# Patient Record
Sex: FEMALE | Race: WHITE | Employment: OTHER | URBAN - NONMETROPOLITAN AREA
[De-identification: names, ages, dates, MRNs, and addresses within clinical notes are randomized per-mention and may not be internally consistent; named-entity substitution may affect disease eponyms.]

---

## 2018-02-22 ENCOUNTER — HOSPITAL ENCOUNTER (OUTPATIENT)
Dept: WOMENS IMAGING | Age: 78
Discharge: HOME | End: 2018-02-22
Payer: MEDICARE

## 2018-02-22 ENCOUNTER — HOSPITAL ENCOUNTER (OUTPATIENT)
Dept: NON INVASIVE DIAGNOSTICS | Age: 78
Discharge: HOME | End: 2018-02-22
Payer: MEDICARE

## 2018-02-22 DIAGNOSIS — R93.7 ABNORMAL BONE DENSITY SCREENING: ICD-10-CM

## 2018-02-22 DIAGNOSIS — Z78.0 POSTMENOPAUSAL: ICD-10-CM

## 2018-02-22 LAB
LV EF: 60 %
LVEF MODALITY: NORMAL

## 2018-02-22 PROCEDURE — 93306 TTE W/DOPPLER COMPLETE: CPT

## 2018-02-22 PROCEDURE — 77080 DXA BONE DENSITY AXIAL: CPT

## 2020-06-05 ENCOUNTER — HOSPITAL ENCOUNTER (OUTPATIENT)
Dept: WOMENS IMAGING | Age: 80
Discharge: HOME | End: 2020-06-05
Payer: MEDICARE

## 2020-06-05 ENCOUNTER — HOSPITAL ENCOUNTER (OUTPATIENT)
Dept: NON INVASIVE DIAGNOSTICS | Age: 80
Discharge: HOME | End: 2020-06-05
Payer: MEDICARE

## 2020-06-05 LAB
LV EF: 60 %
LVEF MODALITY: NORMAL

## 2020-06-05 PROCEDURE — 77080 DXA BONE DENSITY AXIAL: CPT

## 2020-06-05 PROCEDURE — 93306 TTE W/DOPPLER COMPLETE: CPT

## 2020-10-12 ENCOUNTER — HOSPITAL ENCOUNTER (OUTPATIENT)
Dept: GENERAL RADIOLOGY | Age: 80
Discharge: HOME | End: 2020-10-12
Payer: MEDICARE

## 2020-10-12 ENCOUNTER — HOSPITAL ENCOUNTER (OUTPATIENT)
Age: 80
Discharge: HOME | End: 2020-10-12
Payer: MEDICARE

## 2020-10-12 PROCEDURE — 72040 X-RAY EXAM NECK SPINE 2-3 VW: CPT

## 2020-10-15 ENCOUNTER — HOSPITAL ENCOUNTER (OUTPATIENT)
Dept: INTERVENTIONAL RADIOLOGY/VASCULAR | Age: 80
Discharge: HOME | End: 2020-10-15
Payer: MEDICARE

## 2020-10-15 PROCEDURE — 93880 EXTRACRANIAL BILAT STUDY: CPT

## 2020-10-20 ENCOUNTER — PROCEDURE VISIT (OUTPATIENT)
Dept: NEUROLOGY | Age: 80
End: 2020-10-20
Payer: MEDICARE

## 2020-10-20 PROCEDURE — 95911 NRV CNDJ TEST 9-10 STUDIES: CPT | Performed by: PSYCHIATRY & NEUROLOGY

## 2020-10-20 PROCEDURE — 95886 MUSC TEST DONE W/N TEST COMP: CPT | Performed by: PSYCHIATRY & NEUROLOGY

## 2020-10-28 ENCOUNTER — HOSPITAL ENCOUNTER (OUTPATIENT)
Age: 80
Discharge: HOME | End: 2020-10-28
Payer: MEDICARE

## 2020-10-28 ENCOUNTER — OFFICE VISIT (OUTPATIENT)
Dept: CARDIOLOGY CLINIC | Age: 80
End: 2020-10-28
Payer: MEDICARE

## 2020-10-28 ENCOUNTER — HOSPITAL ENCOUNTER (OUTPATIENT)
Dept: GENERAL RADIOLOGY | Age: 80
Discharge: HOME | End: 2020-10-28
Payer: MEDICARE

## 2020-10-28 VITALS — WEIGHT: 132 LBS | HEART RATE: 90 BPM | BODY MASS INDEX: 20 KG/M2 | HEIGHT: 68 IN

## 2020-10-28 PROCEDURE — G8484 FLU IMMUNIZE NO ADMIN: HCPCS | Performed by: INTERNAL MEDICINE

## 2020-10-28 PROCEDURE — G8427 DOCREV CUR MEDS BY ELIG CLIN: HCPCS | Performed by: INTERNAL MEDICINE

## 2020-10-28 PROCEDURE — 72052 X-RAY EXAM NECK SPINE 6/>VWS: CPT

## 2020-10-28 PROCEDURE — 4040F PNEUMOC VAC/ADMIN/RCVD: CPT | Performed by: INTERNAL MEDICINE

## 2020-10-28 PROCEDURE — 99204 OFFICE O/P NEW MOD 45 MIN: CPT | Performed by: INTERNAL MEDICINE

## 2020-10-28 PROCEDURE — 1090F PRES/ABSN URINE INCON ASSESS: CPT | Performed by: INTERNAL MEDICINE

## 2020-10-28 PROCEDURE — 4004F PT TOBACCO SCREEN RCVD TLK: CPT | Performed by: INTERNAL MEDICINE

## 2020-10-28 PROCEDURE — G8399 PT W/DXA RESULTS DOCUMENT: HCPCS | Performed by: INTERNAL MEDICINE

## 2020-10-28 PROCEDURE — G8420 CALC BMI NORM PARAMETERS: HCPCS | Performed by: INTERNAL MEDICINE

## 2020-10-28 PROCEDURE — 93000 ELECTROCARDIOGRAM COMPLETE: CPT | Performed by: INTERNAL MEDICINE

## 2020-10-28 PROCEDURE — 1123F ACP DISCUSS/DSCN MKR DOCD: CPT | Performed by: INTERNAL MEDICINE

## 2020-10-28 RX ORDER — ALENDRONATE SODIUM 70 MG/1
70 TABLET ORAL
COMMUNITY
Start: 2020-08-11

## 2020-10-28 RX ORDER — OMEPRAZOLE 20 MG/1
20 CAPSULE, DELAYED RELEASE ORAL DAILY
COMMUNITY
Start: 2020-09-16

## 2020-10-28 NOTE — PROGRESS NOTES
smokeless tobacco. She reports that she does not drink alcohol or use drugs. Family History  Jayde family history includes Diabetes in her mother; Heart Disease (age of onset: 72) in her father; High Blood Pressure in her mother; Stroke (age of onset: 67) in her mother. Past Surgical History   Past Surgical History:   Procedure Laterality Date    COLONOSCOPY      HYSTERECTOMY  9/03/2014    robotic assisted laprascopic hysterectomy    OTHER SURGICAL HISTORY  3/28/2014    posterior repair enterocele    OVARY SURGERY  2011    Mass on Ovary removed. Subjective:     REVIEW OF SYSTEMS  Constitutional: denies sweats, chills and fever  HENT: denies  congestion, sinus pressure, sneezing and sore throat. Eyes: denies  pain, discharge, redness and itching. Respiratory: denies apnea, cough  Gastrointestinal: denies blood in stool, constipation, diarrhea   Endocrine: denies cold intolerance, heat intolerance, polydipsia. Genitourinary: denies dysuria, enuresis, flank pain and hematuria. Musculoskeletal: denies arthralgias, joint swelling and neck pain. Neurological: denies numbness and headaches. Psychiatric/Behavioral: denies agitation, confusion, decreased concentration and dysphoric mood    All others reviewed and are negative. Objective:     Pulse 90   Ht 5' 8\" (1.727 m)   Wt 132 lb (59.9 kg)   BMI 20.07 kg/m²     Wt Readings from Last 3 Encounters:   10/28/20 132 lb (59.9 kg)   09/03/14 145 lb 1 oz (65.8 kg)   08/27/14 145 lb (65.8 kg)     BP Readings from Last 3 Encounters:   09/03/14 127/63   04/09/14 132/70   02/13/14 124/58       PHYSICAL EXAM  Constitutional: Oriented to person, place, and time. Appears well-developed and well-nourished. HENT:   Head: Normocephalic and atraumatic. Eyes: EOM are normal. Pupils are equal, round, and reactive to light. Neck: Normal range of motion. Neck supple. No JVD present. Cardiovascular: Normal rate , MERCEDES 3/6 and intact distal pulses. Pulmonary/Chest: Effort normal and breath sounds normal. No respiratory distress. No wheezes. No rales. Abdominal: Soft. Bowel sounds are normal. No distension. There is no tenderness. Musculoskeletal: Normal range of motion. No edema. Neurological: Alert and oriented to person, place, and time. No cranial nerve deficit. Coordination normal.   Skin: Skin is warm and dry. Psychiatric: Normal mood and affect.        Lab Results   Component Value Date    CKTOTAL 83 01/17/2012       Lab Results   Component Value Date    WBC 8.4 09/03/2014    RBC 4.77 09/03/2014    RBC 4.80 01/17/2012    HGB 14.9 09/03/2014    HCT 44.2 09/03/2014    MCV 92.7 09/03/2014    MCH 31.3 09/03/2014    MCHC 33.8 09/03/2014    RDW 13.7 09/03/2014     09/03/2014    MPV 8.9 09/03/2014       Lab Results   Component Value Date     09/03/2014    K 3.7 09/03/2014     09/03/2014    CO2 29 09/03/2014    BUN 12 09/03/2014    CREATININE 0.7 09/03/2014    CALCIUM 9.5 09/03/2014    LABGLOM 82 09/03/2014    GLUCOSE 116 09/03/2014       No results found for: ALKPHOS, ALT, AST, PROT, BILITOT, BILIDIR, IBILI, LABALBU    No results found for: MG    Lab Results   Component Value Date    INR 0.93 09/03/2014    INR 0.93 03/28/2014         No results found for: LABA1C    No results found for: TRIG, HDL, LDLCALC, LDLDIRECT, LABVLDL    No results found for: TSH      Testing Reviewed:      I haveindividually reviewed the below cardiac tests    EKG:    ECHO:   Results for orders placed during the hospital encounter of 06/05/20   Echocardiogram 2D/ M-Mode/ Colorflow/ Do    Narrative Transthoracic Echocardiography Report (TTE)     Demographics      Patient Name   [facility]    Gender               Female                  Alex Hi      MR #           [id number]        Race                                                       Ethnicity      Account #      [de-identified]        Room Number      Accession      [id number]        Date of Study 06/05/2020   Number      Date of Birth  1940       Referring Physician  Shanna Farmer      Age            [de-identified] year(s)       Sonographer          Josie Thorne RDCS                                      Interpreting         Echo reader of the                                   Physician            week                                                        Fausto Joyner MD     Procedure    Type of Study      TTE procedure:ECHOCARDIOGRAM COMPLETE 2D W DOPPLER W COLOR. Procedure Date  Date: 06/05/2020 Start: 02:06 PM    Study Location: Echo Lab  Technical Quality: Adequate visualization    Indications: Aortic stenosis. Additional Medical History:Hypertension, Smoker, Osteoarthritis,  Hyperlipidemia. Patient Status: Routine    Height: 68 inches Weight: 149 pounds BSA: 1.8 m^2 BMI: 22.66 kg/m^2    BP: 132/70 mmHg     Conclusions      Summary   Normal left ventricle size and systolic function. Ejection fraction was   estimated at 60 %. There were no regional left ventricular wall motion   abnormalities and wall thickness was within normal limits. Doppler parameters were consistent with abnormal left ventricular   relaxation (grade 1 diastolic dysfunction). The left atrium is Moderately dilated. There is moderate aortic stenosis with valve area of 1.2 sq cm. The maximum aortic valve gradient is 30 mmHg, the mean gradient is 18   mmHg, and the peak velocity is 2.7 m/s. Signature      ----------------------------------------------------------------   Electronically signed by Fausto Joyner MD (Interpreting   physician) on 06/05/2020 at 06:25 PM   ----------------------------------------------------------------      Findings      Mitral Valve   The mitral valve structure was normal with normal leaflet separation. DOPPLER: The transmitral velocity was within the normal range with no   evidence for mitral stenosis. Mild mitral regurgitation is present.       Aortic Valve   Aortic valve appears tricuspid. Aortic valve leaflets are Moderately   calcified. Leaflets exhibited mild to moderately increased thickness and   mildly reduced cuspal separation of the aortic valve. Mild aortic   regurgitation is noted. There is moderate aortic stenosis with valve area of 1.2 sq cm. The maximum aortic valve gradient is 30 mmHg, the mean gradient is 18   mmHg, and the peak velocity is 2.7 m/s. Tricuspid Valve   The tricuspid valve structure was normal with normal leaflet separation. DOPPLER: There was no evidence of tricuspid stenosis. Mild tricuspid regurgitation visualized. Pulmonic Valve   The pulmonic valve leaflets exhibited normal thickness, no calcification,   and normal cuspal separation. DOPPLER: The transpulmonic velocity was   within the normal range with no evidence for regurgitation. Left Atrium   The left atrium is Moderately dilated. Left Ventricle   Normal left ventricle size and systolic function. Ejection fraction was   estimated at 60 %. There were no regional left ventricular wall motion   abnormalities and wall thickness was within normal limits. Doppler parameters were consistent with abnormal left ventricular   relaxation (grade 1 diastolic dysfunction). Right Atrium   Right atrial size was normal.      Right Ventricle   The right ventricular size was normal with normal systolic function and   wall thickness. Pericardial Effusion   The pericardium was normal in appearance with no evidence of a pericardial   effusion. Pleural Effusion   No evidence of pleural effusion. Aorta / Great Vessels   -Aortic root dimension within normal limits.   -The Pulmonary artery is within normal limits. -IVC size is within normal limits with normal respiratory phasic changes.      M-Mode/2D Measurements & Calculations      LV Diastolic   LV Systolic Dimension:    AV Cusp Separation: 1.1 cmLA   Dimension: 3.4 2.4 cm                    Dimension: 3.1 cmAO Root   cm LV Volume Diastolic: 03.3 Dimension: 3.4 cmLA Area: 14.4   LV FS:29.4 %   ml                        cm^2   LV PW          LV Volume Systolic: 21.6   Diastolic: 1.1 ml   cm             LV EDV/LV EDV Index: 47.4   Septum         ml/26 m^2LV ESV/LV ESV    RV Diastolic Dimension: 2.5 cm   Diastolic: 1.2 Index: 83.1 ml/11 m^2   cm             EF Calculated: 57.4 %     LA/Aorta: 0.91                                            Ascending Aorta: 3 cm                                            LA volume/Index: 32.8 ml /18m^2                     LVOT: 2 cm     Doppler Measurements & Calculations      MV Peak E-Wave: 70   AV Peak Velocity: 261    LVOT Peak Velocity: 99.2   cm/s                 cm/s                     cm/s   MV Peak A-Wave: 120  AV Peak Gradient: 27.25  LVOT Mean Velocity: 69.5   cm/s                 mmHg                     cm/s   MV E/A Ratio: 0.58   AV Mean Velocity: 190    LVOT Peak Gradient: 4   MV Peak Gradient:    cm/s                     mmHgLVOT Mean Gradient: 2   1.96 mmHg            AV Mean Gradient: 13     mmHg                        mmHg   MV Deceleration      AV VTI: 55.2 cm          TV Peak E-Wave: 45.4 cm/s   Time: 391 msec       AV Area                  TV Peak A-Wave: 41 cm/s                        (Continuity):1.16 cm^2                                                 TV Peak Gradient: 0.82 mmHg   MV E' Septal         LVOT VTI: 20.4 cm   Velocity: 3 cm/s     AV P1/2t: 441 msec       PV Peak Velocity: 66 cm/s   MV A' Septal         IVRT: 130 msec           PV Peak Gradient: 1.74 mmHg   Velocity: 7.9 cm/s   MV E' Lateral   Velocity: 4.5 cm/s   AV DVI (VTI): 0.37AV DVI   MV A' Lateral        (Vmax):0.38   Velocity: 11.3 cm/s   E/E' septal: 23.33   E/E' lateral: 15.56     [email or web address] DocKey=[id number]%3d%3d       STRESS:    CATH:    Assessment/Plan       Diagnosis Orders   1. Hypertension, unspecified type  EKG 12 Lead   2. Aortic valve stenosis, etiology of cardiac valve disease unspecified  EKG 12 Lead     Moderate Aortic stenosis ELIAZAR 1.2, mean gradient 18 mm Hg  HTN  HLD  OA    Doing well no major symptoms   Unable to exert due to her arthritis  Reviewed Echo  Reviewed carotid duplex  Will need routine echos to monitor the AS  Also has neck pains, concerning for cervical arthrtis vs nerve impingement  Discussed about getting XR of cervical spine and follow up with PCP  Patient agrees  The patient is asked to make an attempt to improve diet and exercise patterns to aid in medical management of this problem. Advised more plant based nutrition/meditarrean diet   Advised patient to call office or seek immediate medical attention if there is any new onset of  any chest pain, sob, palpitations, lightheadedness, dizziness, orthopnea, PND or pedal edema. All medication side effects were discussed in details. Thank youfor allowing me to participate in the care of this patient. Please do not hesitate to contact me for any further questions. No follow-ups on file.        Electronically signed by Miriam Vora MD [address] [facility]  10/28/2020 at 9:32 AM EDT

## 2021-06-17 ENCOUNTER — HOSPITAL ENCOUNTER (OUTPATIENT)
Dept: NON INVASIVE DIAGNOSTICS | Age: 81
Discharge: HOME | End: 2021-06-17
Payer: MEDICARE

## 2021-06-17 DIAGNOSIS — I35.0 AORTIC VALVE STENOSIS, ETIOLOGY OF CARDIAC VALVE DISEASE UNSPECIFIED: ICD-10-CM

## 2021-06-17 DIAGNOSIS — I10 HYPERTENSION, UNSPECIFIED TYPE: ICD-10-CM

## 2021-06-17 LAB
LV EF: 58 %
LVEF MODALITY: NORMAL

## 2021-06-17 PROCEDURE — 93306 TTE W/DOPPLER COMPLETE: CPT

## 2021-06-23 ENCOUNTER — OFFICE VISIT (OUTPATIENT)
Dept: CARDIOLOGY CLINIC | Age: 81
End: 2021-06-23
Payer: MEDICARE

## 2021-06-23 VITALS
HEART RATE: 72 BPM | DIASTOLIC BLOOD PRESSURE: 60 MMHG | HEIGHT: 68 IN | SYSTOLIC BLOOD PRESSURE: 118 MMHG | BODY MASS INDEX: 19.55 KG/M2 | WEIGHT: 129 LBS

## 2021-06-23 DIAGNOSIS — I35.0 MODERATE AORTIC STENOSIS: Primary | ICD-10-CM

## 2021-06-23 PROCEDURE — 4004F PT TOBACCO SCREEN RCVD TLK: CPT | Performed by: INTERNAL MEDICINE

## 2021-06-23 PROCEDURE — 1090F PRES/ABSN URINE INCON ASSESS: CPT | Performed by: INTERNAL MEDICINE

## 2021-06-23 PROCEDURE — 99213 OFFICE O/P EST LOW 20 MIN: CPT | Performed by: INTERNAL MEDICINE

## 2021-06-23 PROCEDURE — G8427 DOCREV CUR MEDS BY ELIG CLIN: HCPCS | Performed by: INTERNAL MEDICINE

## 2021-06-23 PROCEDURE — 4040F PNEUMOC VAC/ADMIN/RCVD: CPT | Performed by: INTERNAL MEDICINE

## 2021-06-23 PROCEDURE — G8399 PT W/DXA RESULTS DOCUMENT: HCPCS | Performed by: INTERNAL MEDICINE

## 2021-06-23 PROCEDURE — 1123F ACP DISCUSS/DSCN MKR DOCD: CPT | Performed by: INTERNAL MEDICINE

## 2021-06-23 PROCEDURE — G8420 CALC BMI NORM PARAMETERS: HCPCS | Performed by: INTERNAL MEDICINE

## 2021-06-23 NOTE — PROGRESS NOTES
used smokeless tobacco. She reports that she does not drink alcohol and does not use drugs. Family History  Jayde family history includes Diabetes in her mother; Heart Disease (age of onset: 72) in her father; High Blood Pressure in her mother; Stroke (age of onset: 67) in her mother. Past Surgical History   Past Surgical History:   Procedure Laterality Date    COLONOSCOPY      HYSTERECTOMY  9/03/2014    robotic assisted laprascopic hysterectomy    OTHER SURGICAL HISTORY  3/28/2014    posterior repair enterocele    OVARY SURGERY  2011    Mass on Ovary removed. Subjective:     REVIEW OF SYSTEMS  Constitutional: denies sweats, chills and fever  HENT: denies  congestion, sinus pressure, sneezing and sore throat. Eyes: denies  pain, discharge, redness and itching. Respiratory: denies apnea, cough  Gastrointestinal: denies blood in stool, constipation, diarrhea   Endocrine: denies cold intolerance, heat intolerance, polydipsia. Genitourinary: denies dysuria, enuresis, flank pain and hematuria. Musculoskeletal: denies arthralgias, joint swelling and neck pain. Neurological: denies numbness and headaches. Psychiatric/Behavioral: denies agitation, confusion, decreased concentration and dysphoric mood    All others reviewed and are negative. Objective:     /60   Pulse 72   Ht 5' 8\" (1.727 m)   Wt 129 lb (58.5 kg)   BMI 19.61 kg/m²     Wt Readings from Last 3 Encounters:   06/23/21 129 lb (58.5 kg)   10/28/20 132 lb (59.9 kg)   09/03/14 145 lb 1 oz (65.8 kg)     BP Readings from Last 3 Encounters:   06/23/21 118/60   09/03/14 127/63   04/09/14 132/70       PHYSICAL EXAM  Constitutional: Oriented to person, place, and time. Appears well-developed and well-nourished. HENT:   Head: Normocephalic and atraumatic. Eyes: EOM are normal. Pupils are equal, round, and reactive to light. Neck: Normal range of motion. Neck supple. No JVD present.    Cardiovascular: Normal rate , MERCEDES 3/6 and intact distal pulses. Pulmonary/Chest: Effort normal and breath sounds normal. No respiratory distress. No wheezes. No rales. Abdominal: Soft. Bowel sounds are normal. No distension. There is no tenderness. Musculoskeletal: Normal range of motion. No edema. Neurological: Alert and oriented to person, place, and time. No cranial nerve deficit. Coordination normal.   Skin: Skin is warm and dry. Psychiatric: Normal mood and affect.        Lab Results   Component Value Date    CKTOTAL 83 01/17/2012       Lab Results   Component Value Date    WBC 8.4 09/03/2014    RBC 4.77 09/03/2014    RBC 4.80 01/17/2012    HGB 14.9 09/03/2014    HCT 44.2 09/03/2014    MCV 92.7 09/03/2014    MCH 31.3 09/03/2014    MCHC 33.8 09/03/2014    RDW 13.7 09/03/2014     09/03/2014    MPV 8.9 09/03/2014       Lab Results   Component Value Date     09/03/2014    K 3.7 09/03/2014     09/03/2014    CO2 29 09/03/2014    BUN 12 09/03/2014    CREATININE 0.7 09/03/2014    CALCIUM 9.5 09/03/2014    LABGLOM 82 09/03/2014    GLUCOSE 116 09/03/2014       No results found for: ALKPHOS, ALT, AST, PROT, BILITOT, BILIDIR, IBILI, LABALBU    No results found for: MG    Lab Results   Component Value Date    INR 0.93 09/03/2014    INR 0.93 03/28/2014         No results found for: LABA1C    No results found for: TRIG, HDL, LDLCALC, LDLDIRECT, LABVLDL    No results found for: TSH      Testing Reviewed:      I haveindividually reviewed the below cardiac tests    EKG:    ECHO:   Results for orders placed during the hospital encounter of 06/05/20   Echocardiogram 2D/ M-Mode/ Colorflow/ Do    Narrative Transthoracic Echocardiography Report (TTE)     Demographics      Patient Name   [facility]    Gender               Female                  MICHELLE      MR #           [id number]        Race                                                       Ethnicity      Account #      [de-identified]        Room Number      Accession      [id number] Date of Study        06/05/2020   Number      Date of Birth  1940       Referring Physician  Mariposa Niño      Age            [de-identified] year(s)       Sonographer          Delia Vega [facility]                                      Interpreting         Echo reader of the                                   Physician            week                                                        Juan Manuel Ruff MD     Procedure    Type of Study      TTE procedure:ECHOCARDIOGRAM COMPLETE 2D W DOPPLER W COLOR. Procedure Date  Date: 06/05/2020 Start: 02:06 PM    Study Location: Echo Lab  Technical Quality: Adequate visualization    Indications: Aortic stenosis. Additional Medical History:Hypertension, Smoker, Osteoarthritis,  Hyperlipidemia. Patient Status: Routine    Height: 68 inches Weight: 149 pounds BSA: 1.8 m^2 BMI: 22.66 kg/m^2    BP: 132/70 mmHg     Conclusions      Summary   Normal left ventricle size and systolic function. Ejection fraction was   estimated at 60 %. There were no regional left ventricular wall motion   abnormalities and wall thickness was within normal limits. Doppler parameters were consistent with abnormal left ventricular   relaxation (grade 1 diastolic dysfunction). The left atrium is Moderately dilated. There is moderate aortic stenosis with valve area of 1.2 sq cm. The maximum aortic valve gradient is 30 mmHg, the mean gradient is 18   mmHg, and the peak velocity is 2.7 m/s. Signature      ----------------------------------------------------------------   Electronically signed by Juan Manuel Ruff MD (Interpreting   physician) on 06/05/2020 at 06:25 PM   ----------------------------------------------------------------      Findings      Mitral Valve   The mitral valve structure was normal with normal leaflet separation. DOPPLER: The transmitral velocity was within the normal range with no   evidence for mitral stenosis. Mild mitral regurgitation is present.       Aortic Valve Aortic valve appears tricuspid. Aortic valve leaflets are Moderately   calcified. Leaflets exhibited mild to moderately increased thickness and   mildly reduced cuspal separation of the aortic valve. Mild aortic   regurgitation is noted. There is moderate aortic stenosis with valve area of 1.2 sq cm. The maximum aortic valve gradient is 30 mmHg, the mean gradient is 18   mmHg, and the peak velocity is 2.7 m/s. Tricuspid Valve   The tricuspid valve structure was normal with normal leaflet separation. DOPPLER: There was no evidence of tricuspid stenosis. Mild tricuspid regurgitation visualized. Pulmonic Valve   The pulmonic valve leaflets exhibited normal thickness, no calcification,   and normal cuspal separation. DOPPLER: The transpulmonic velocity was   within the normal range with no evidence for regurgitation. Left Atrium   The left atrium is Moderately dilated. Left Ventricle   Normal left ventricle size and systolic function. Ejection fraction was   estimated at 60 %. There were no regional left ventricular wall motion   abnormalities and wall thickness was within normal limits. Doppler parameters were consistent with abnormal left ventricular   relaxation (grade 1 diastolic dysfunction). Right Atrium   Right atrial size was normal.      Right Ventricle   The right ventricular size was normal with normal systolic function and   wall thickness. Pericardial Effusion   The pericardium was normal in appearance with no evidence of a pericardial   effusion. Pleural Effusion   No evidence of pleural effusion. Aorta / Great Vessels   -Aortic root dimension within normal limits.   -The Pulmonary artery is within normal limits. -IVC size is within normal limits with normal respiratory phasic changes.      M-Mode/2D Measurements & Calculations      LV Diastolic   LV Systolic Dimension:    AV Cusp Separation: 1.1 cmLA   Dimension: 3.4 2.4 cm                    Dimension: 3.1 cmAO Root   cm             LV Volume Diastolic: 80.4 Dimension: 3.4 cmLA Area: 14.4   LV FS:29.4 %   ml                        cm^2   LV PW          LV Volume Systolic: 44.2   Diastolic: 1.1 ml   cm             LV EDV/LV EDV Index: 47.4   Septum         ml/26 m^2LV ESV/LV ESV    RV Diastolic Dimension: 2.5 cm   Diastolic: 1.2 Index: 95.0 ml/11 m^2   cm             EF Calculated: 57.4 %     LA/Aorta: 0.91                                            Ascending Aorta: 3 cm                                            LA volume/Index: 32.8 ml /18m^2                     LVOT: 2 cm     Doppler Measurements & Calculations      MV Peak E-Wave: 70   AV Peak Velocity: 261    LVOT Peak Velocity: 99.2   cm/s                 cm/s                     cm/s   MV Peak A-Wave: 120  AV Peak Gradient: 27.25  LVOT Mean Velocity: 69.5   cm/s                 mmHg                     cm/s   MV E/A Ratio: 0.58   AV Mean Velocity: 190    LVOT Peak Gradient: 4   MV Peak Gradient:    cm/s                     mmHgLVOT Mean Gradient: 2   1.96 mmHg            AV Mean Gradient: 13     mmHg                        mmHg   MV Deceleration      AV VTI: 55.2 cm          TV Peak E-Wave: 45.4 cm/s   Time: 391 msec       AV Area                  TV Peak A-Wave: 41 cm/s                        (Continuity):1.16 cm^2                                                 TV Peak Gradient: 0.82 mmHg   MV E' Septal         LVOT VTI: 20.4 cm   Velocity: 3 cm/s     AV P1/2t: 441 msec       PV Peak Velocity: 66 cm/s   MV A' Septal         IVRT: 130 msec           PV Peak Gradient: 1.74 mmHg   Velocity: 7.9 cm/s   MV E' Lateral   Velocity: 4.5 cm/s   AV DVI (VTI): 0.37AV DVI   MV A' Lateral        (Vmax):0.38   Velocity: 11.3 cm/s   E/E' septal: 23.33   E/E' lateral: 15.56     [email or web address]? DocKey=[id number]%3d%3d       STRESS:    CATH:    Assessment/Plan       Diagnosis Orders   1. Moderate aortic stenosis       Moderate Aortic stenosis ELIAZAR 1.1, mean gradient 26mm Hg  HTN  HLD  OA    Doing well no major symptoms   Unable to exert due to her arthritis  Reviewed Echo  Reviewed carotid duplex  Will need routine echos to monitor the AS  Also has neck pains, concerning for cervical arthrtis vs nerve impingement  Discussed about getting XR of cervical spine and follow up with PCP  The patient is asked to make an attempt to improve diet and exercise patterns to aid in medical management of this problem. Advised more plant based nutrition/meditarrean diet   Advised patient to call office or seek immediate medical attention if there is any new onset of  any chest pain, sob, palpitations, lightheadedness, dizziness, orthopnea, PND or pedal edema. All medication side effects were discussed in details. Thank youfor allowing me to participate in the care of this patient. Please do not hesitate to contact me for any further questions. Return in about 8 months (around 2/23/2022), or if symptoms worsen or fail to improve, for Regular follow up, Review testing.        Electronically signed by Emma Marmolejo MD [facility]  6/23/2021 at 9:32 AM EDT

## 2021-12-21 ENCOUNTER — HOSPITAL ENCOUNTER (OUTPATIENT)
Dept: NON INVASIVE DIAGNOSTICS | Age: 81
Discharge: HOME | End: 2021-12-21
Payer: MEDICARE

## 2021-12-21 DIAGNOSIS — I35.0 MODERATE AORTIC STENOSIS: ICD-10-CM

## 2021-12-21 LAB
LV EF: 65 %
LVEF MODALITY: NORMAL

## 2021-12-21 PROCEDURE — 93306 TTE W/DOPPLER COMPLETE: CPT

## 2021-12-23 ENCOUNTER — TELEPHONE (OUTPATIENT)
Dept: CARDIOLOGY CLINIC | Age: 81
End: 2021-12-23

## 2022-01-12 ENCOUNTER — OFFICE VISIT (OUTPATIENT)
Dept: CARDIOLOGY CLINIC | Age: 82
End: 2022-01-12
Payer: MEDICARE

## 2022-01-12 VITALS
HEIGHT: 68 IN | SYSTOLIC BLOOD PRESSURE: 112 MMHG | WEIGHT: 134 LBS | HEART RATE: 95 BPM | BODY MASS INDEX: 20.31 KG/M2 | DIASTOLIC BLOOD PRESSURE: 66 MMHG

## 2022-01-12 DIAGNOSIS — I35.0 MODERATE AORTIC STENOSIS: Primary | ICD-10-CM

## 2022-01-12 PROCEDURE — G8484 FLU IMMUNIZE NO ADMIN: HCPCS | Performed by: INTERNAL MEDICINE

## 2022-01-12 PROCEDURE — G8420 CALC BMI NORM PARAMETERS: HCPCS | Performed by: INTERNAL MEDICINE

## 2022-01-12 PROCEDURE — 4040F PNEUMOC VAC/ADMIN/RCVD: CPT | Performed by: INTERNAL MEDICINE

## 2022-01-12 PROCEDURE — 93000 ELECTROCARDIOGRAM COMPLETE: CPT | Performed by: INTERNAL MEDICINE

## 2022-01-12 PROCEDURE — 1090F PRES/ABSN URINE INCON ASSESS: CPT | Performed by: INTERNAL MEDICINE

## 2022-01-12 PROCEDURE — 4004F PT TOBACCO SCREEN RCVD TLK: CPT | Performed by: INTERNAL MEDICINE

## 2022-01-12 PROCEDURE — 1123F ACP DISCUSS/DSCN MKR DOCD: CPT | Performed by: INTERNAL MEDICINE

## 2022-01-12 PROCEDURE — 99213 OFFICE O/P EST LOW 20 MIN: CPT | Performed by: INTERNAL MEDICINE

## 2022-01-12 PROCEDURE — G8399 PT W/DXA RESULTS DOCUMENT: HCPCS | Performed by: INTERNAL MEDICINE

## 2022-01-12 PROCEDURE — G8427 DOCREV CUR MEDS BY ELIG CLIN: HCPCS | Performed by: INTERNAL MEDICINE

## 2022-01-12 NOTE — PROGRESS NOTES
[address] [place] [address] [facility] [address]  Dept: [phone]  Dept Fax: [phone]  Loc: [phone]    Visit Date: 1/12/2022    Ms. Rolando Rodriguez is a 80 y.o. female  who presented for:  Chief Complaint   Patient presents with    Check-Up     mod aortic stenosis    Hypertension       HPI:   79 yo F c hx of Aortic stenosis, HTN, HLD and osteoarthritis is here for a follow up. She was previously seen by Dr. Des Solis in 2014. She underwent recent Echo in 6/5/20 which showed normal LVSF EF 60%, has moderate AS, recently had echo and is here to follow up. Patient states she does not have much symptoms. Current Outpatient Medications:     NONFORMULARY, Vit B, Disp: , Rfl:     omeprazole (PRILOSEC) 20 MG delayed release capsule, Take 20 mg by mouth Daily , Disp: , Rfl:     alendronate (FOSAMAX) 70 MG tablet, Take 70 mg by mouth every 7 days , Disp: , Rfl:     Probiotic Product (PROBIOTIC DAILY PO), Take by mouth daily, Disp: , Rfl:     BIOTIN PO, Take by mouth daily, Disp: , Rfl:     ibuprofen (ADVIL;MOTRIN) 600 MG tablet, Take 1 tablet by mouth every 6 hours as needed for Pain., Disp: 60 tablet, Rfl: 1    Losartan Potassium-HCTZ (HYZAAR PO), Take 12.5 mg by mouth daily. , Disp: , Rfl:     amLODIPine (NORVASC) 5 MG tablet, Take 5 mg by mouth daily. , Disp: , Rfl:     Ascorbic Acid (VITAMIN C) 500 MG tablet, Take 500 mg by mouth daily. , Disp: , Rfl:     Omega-3 Fatty Acids (FISH OIL PO), Take  by mouth daily. , Disp: , Rfl:     GARLIC PO, Take  by mouth daily. , Disp: , Rfl:     simvastatin (ZOCOR) 20 MG tablet, Take 20 mg by mouth nightly., Disp: , Rfl:     Calcium Carbonate-Vitamin D (CALCIUM-VITAMIN D) 500-200 MG-UNIT per tablet, Take 1 tablet by mouth daily. , Disp: , Rfl:     Past Medical History  [facility]  has a past medical history of Hyperlipidemia, Hypertension, and Osteoarthritis. Social History  [facility]  reports that she has been smoking. She has a 14.00 pack-year smoking history. She has never used smokeless tobacco. She reports that she does not drink alcohol and does not use drugs. Family History  Jayde family history includes Diabetes in her mother; Heart Disease (age of onset: 72) in her father; High Blood Pressure in her mother; Stroke (age of onset: 67) in her mother. Past Surgical History   Past Surgical History:   Procedure Laterality Date    COLONOSCOPY      HYSTERECTOMY  9/03/2014    robotic assisted laprascopic hysterectomy    OTHER SURGICAL HISTORY  3/28/2014    posterior repair enterocele    OVARY SURGERY  2011    Mass on Ovary removed. Subjective:     REVIEW OF SYSTEMS  Constitutional: denies sweats, chills and fever  HENT: denies  congestion, sinus pressure, sneezing and sore throat. Eyes: denies  pain, discharge, redness and itching. Respiratory: denies apnea, cough  Gastrointestinal: denies blood in stool, constipation, diarrhea   Endocrine: denies cold intolerance, heat intolerance, polydipsia. Genitourinary: denies dysuria, enuresis, flank pain and hematuria. Musculoskeletal: denies arthralgias, joint swelling and neck pain. Neurological: denies numbness and headaches. Psychiatric/Behavioral: denies agitation, confusion, decreased concentration and dysphoric mood    All others reviewed and are negative. Objective:     /66   Pulse 95   Ht 5' 8\" (1.727 m)   Wt 134 lb (60.8 kg)   BMI 20.37 kg/m²     Wt Readings from Last 3 Encounters:   01/12/22 134 lb (60.8 kg)   06/23/21 129 lb (58.5 kg)   10/28/20 132 lb (59.9 kg)     BP Readings from Last 3 Encounters:   01/12/22 112/66   06/23/21 118/60   09/03/14 127/63       PHYSICAL EXAM  Constitutional: Oriented to person, place, and time. Appears well-developed and well-nourished. HENT:   Head: Normocephalic and atraumatic. Eyes: EOM are normal. Pupils are equal, round, and reactive to light. Neck: Normal range of motion. Neck supple.  No JVD present. Cardiovascular: Normal rate , MERCEDES 3/6 and intact distal pulses. Pulmonary/Chest: Effort normal and breath sounds normal. No respiratory distress. No wheezes. No rales. Abdominal: Soft. Bowel sounds are normal. No distension. There is no tenderness. Musculoskeletal: Normal range of motion. No edema. Neurological: Alert and oriented to person, place, and time. No cranial nerve deficit. Coordination normal.   Skin: Skin is warm and dry. Psychiatric: Normal mood and affect.        Lab Results   Component Value Date    CKTOTAL 83 01/17/2012       Lab Results   Component Value Date    WBC 8.4 09/03/2014    RBC 4.77 09/03/2014    RBC 4.80 01/17/2012    HGB 14.9 09/03/2014    HCT 44.2 09/03/2014    MCV 92.7 09/03/2014    MCH 31.3 09/03/2014    MCHC 33.8 09/03/2014    RDW 13.7 09/03/2014     09/03/2014    MPV 8.9 09/03/2014       Lab Results   Component Value Date     09/03/2014    K 3.7 09/03/2014     09/03/2014    CO2 29 09/03/2014    BUN 12 09/03/2014    CREATININE 0.7 09/03/2014    CALCIUM 9.5 09/03/2014    LABGLOM 82 09/03/2014    GLUCOSE 116 09/03/2014       No results found for: ALKPHOS, ALT, AST, PROT, BILITOT, BILIDIR, IBILI, LABALBU    No results found for: MG    Lab Results   Component Value Date    INR 0.93 09/03/2014    INR 0.93 03/28/2014         No results found for: LABA1C    No results found for: TRIG, HDL, LDLCALC, LDLDIRECT, LABVLDL    No results found for: TSH      Testing Reviewed:      I haveindividually reviewed the below cardiac tests    EKG:    ECHO:   Results for orders placed during the hospital encounter of 06/05/20   Echocardiogram 2D/ M-Mode/ Colorflow/ Do    Narrative Transthoracic Echocardiography Report (TTE)     Demographics      Patient Name   [facility]    Gender               Female                  Alex Hi      MR #           [id number]        Race                                                       Ethnicity      Account #      [de-identified] Room Number      Accession      [id number]        Date of Study        06/05/2020   Number      Date of Birth  1940       Referring Physician  Jean Nunez      Age            [de-identified] year(s)       Sonographer          Darin Talbot RDCS                                      Interpreting         Echo reader of the                                   Physician            week                                                        Montana Baig MD     Procedure    Type of Study      TTE procedure:ECHOCARDIOGRAM COMPLETE 2D W DOPPLER W COLOR. Procedure Date  Date: 06/05/2020 Start: 02:06 PM    Study Location: Echo Lab  Technical Quality: Adequate visualization    Indications: Aortic stenosis. Additional Medical History:Hypertension, Smoker, Osteoarthritis,  Hyperlipidemia. Patient Status: Routine    Height: 68 inches Weight: 149 pounds BSA: 1.8 m^2 BMI: 22.66 kg/m^2    BP: 132/70 mmHg     Conclusions      Summary   Normal left ventricle size and systolic function. Ejection fraction was   estimated at 60 %. There were no regional left ventricular wall motion   abnormalities and wall thickness was within normal limits. Doppler parameters were consistent with abnormal left ventricular   relaxation (grade 1 diastolic dysfunction). The left atrium is Moderately dilated. There is moderate aortic stenosis with valve area of 1.2 sq cm. The maximum aortic valve gradient is 30 mmHg, the mean gradient is 18   mmHg, and the peak velocity is 2.7 m/s. Signature      ----------------------------------------------------------------   Electronically signed by Montana Baig MD (Interpreting   physician) on 06/05/2020 at 06:25 PM   ----------------------------------------------------------------      Findings      Mitral Valve   The mitral valve structure was normal with normal leaflet separation. DOPPLER: The transmitral velocity was within the normal range with no   evidence for mitral stenosis.    Mild mitral regurgitation is present. Aortic Valve   Aortic valve appears tricuspid. Aortic valve leaflets are Moderately   calcified. Leaflets exhibited mild to moderately increased thickness and   mildly reduced cuspal separation of the aortic valve. Mild aortic   regurgitation is noted. There is moderate aortic stenosis with valve area of 1.2 sq cm. The maximum aortic valve gradient is 30 mmHg, the mean gradient is 18   mmHg, and the peak velocity is 2.7 m/s. Tricuspid Valve   The tricuspid valve structure was normal with normal leaflet separation. DOPPLER: There was no evidence of tricuspid stenosis. Mild tricuspid regurgitation visualized. Pulmonic Valve   The pulmonic valve leaflets exhibited normal thickness, no calcification,   and normal cuspal separation. DOPPLER: The transpulmonic velocity was   within the normal range with no evidence for regurgitation. Left Atrium   The left atrium is Moderately dilated. Left Ventricle   Normal left ventricle size and systolic function. Ejection fraction was   estimated at 60 %. There were no regional left ventricular wall motion   abnormalities and wall thickness was within normal limits. Doppler parameters were consistent with abnormal left ventricular   relaxation (grade 1 diastolic dysfunction). Right Atrium   Right atrial size was normal.      Right Ventricle   The right ventricular size was normal with normal systolic function and   wall thickness. Pericardial Effusion   The pericardium was normal in appearance with no evidence of a pericardial   effusion. Pleural Effusion   No evidence of pleural effusion. Aorta / Great Vessels   -Aortic root dimension within normal limits.   -The Pulmonary artery is within normal limits. -IVC size is within normal limits with normal respiratory phasic changes.      M-Mode/2D Measurements & Calculations      LV Diastolic   LV Systolic Dimension:    AV Cusp Separation: 1.1 cmLA Dimension: 3.4 2.4 cm                    Dimension: 3.1 cmAO Root   cm             LV Volume Diastolic: 95.7 Dimension: 3.4 cmLA Area: 14.4   LV FS:29.4 %   ml                        cm^2   LV PW          LV Volume Systolic: 65.5   Diastolic: 1.1 ml   cm             LV EDV/LV EDV Index: 47.4   Septum         ml/26 m^2LV ESV/LV ESV    RV Diastolic Dimension: 2.5 cm   Diastolic: 1.2 Index: 72.3 ml/11 m^2   cm             EF Calculated: 57.4 %     LA/Aorta: 0.91                                            Ascending Aorta: 3 cm                                            LA volume/Index: 32.8 ml /18m^2                     LVOT: 2 cm     Doppler Measurements & Calculations      MV Peak E-Wave: 70   AV Peak Velocity: 261    LVOT Peak Velocity: 99.2   cm/s                 cm/s                     cm/s   MV Peak A-Wave: 120  AV Peak Gradient: 27.25  LVOT Mean Velocity: 69.5   cm/s                 mmHg                     cm/s   MV E/A Ratio: 0.58   AV Mean Velocity: 190    LVOT Peak Gradient: 4   MV Peak Gradient:    cm/s                     mmHgLVOT Mean Gradient: 2   1.96 mmHg            AV Mean Gradient: 13     mmHg                        mmHg   MV Deceleration      AV VTI: 55.2 cm          TV Peak E-Wave: 45.4 cm/s   Time: 391 msec       AV Area                  TV Peak A-Wave: 41 cm/s                        (Continuity):1.16 cm^2                                                 TV Peak Gradient: 0.82 mmHg   MV E' Septal         LVOT VTI: 20.4 cm   Velocity: 3 cm/s     AV P1/2t: 441 msec       PV Peak Velocity: 66 cm/s   MV A' Septal         IVRT: 130 msec           PV Peak Gradient: 1.74 mmHg   Velocity: 7.9 cm/s   MV E' Lateral   Velocity: 4.5 cm/s   AV DVI (VTI): 0.37AV DVI   MV A' Lateral        (Vmax):0.38   Velocity: 11.3 cm/s   E/E' septal: 23.33   E/E' lateral: 15.56     [id number]  [id number]%3d

## 2022-04-20 ENCOUNTER — OFFICE VISIT (OUTPATIENT)
Dept: CARDIOLOGY CLINIC | Age: 82
End: 2022-04-20
Payer: MEDICARE

## 2022-04-20 VITALS
SYSTOLIC BLOOD PRESSURE: 128 MMHG | HEIGHT: 68 IN | BODY MASS INDEX: 20.46 KG/M2 | WEIGHT: 135 LBS | HEART RATE: 96 BPM | DIASTOLIC BLOOD PRESSURE: 60 MMHG

## 2022-04-20 DIAGNOSIS — I35.0 SEVERE AORTIC STENOSIS: Primary | ICD-10-CM

## 2022-04-20 PROCEDURE — G8420 CALC BMI NORM PARAMETERS: HCPCS | Performed by: INTERNAL MEDICINE

## 2022-04-20 PROCEDURE — 1123F ACP DISCUSS/DSCN MKR DOCD: CPT | Performed by: INTERNAL MEDICINE

## 2022-04-20 PROCEDURE — G8399 PT W/DXA RESULTS DOCUMENT: HCPCS | Performed by: INTERNAL MEDICINE

## 2022-04-20 PROCEDURE — 1090F PRES/ABSN URINE INCON ASSESS: CPT | Performed by: INTERNAL MEDICINE

## 2022-04-20 PROCEDURE — G8428 CUR MEDS NOT DOCUMENT: HCPCS | Performed by: INTERNAL MEDICINE

## 2022-04-20 PROCEDURE — 4004F PT TOBACCO SCREEN RCVD TLK: CPT | Performed by: INTERNAL MEDICINE

## 2022-04-20 PROCEDURE — 4040F PNEUMOC VAC/ADMIN/RCVD: CPT | Performed by: INTERNAL MEDICINE

## 2022-04-20 PROCEDURE — 99213 OFFICE O/P EST LOW 20 MIN: CPT | Performed by: INTERNAL MEDICINE

## 2022-04-20 NOTE — PROGRESS NOTES
Pt here for 3 mo check up     Pt states med list is correct from last appt no changes     Pt continues with fatigue,

## 2022-04-20 NOTE — PROGRESS NOTES
[address] [facility], [place], operated by [facility] [address]  Dept: [phone]  Dept Fax: [phone]  Loc: [phone]    Visit Date: 4/20/2022    Ms. Lester Peace is a 80 y.o. female  who presented for:  Chief Complaint   Patient presents with    Check-Up     aortic stenosis    Hypertension       HPI:   81 yo F c hx of Aortic stenosis, HTN, HLD and osteoarthritis is here for a follow up. She was previously seen by Dr. Maritza Montenegro in 2014. She underwent recent Echo in 6/5/20 which showed normal LVSF EF 60%, has severe AS is here for a follow up. On the last visit, she just wanted to monitor symptoms. Today she continues to not reports any symptoms and wants to monitor. Patient states she does not have much symptoms. Current Outpatient Medications:     NONFORMULARY, Vit B, Disp: , Rfl:     omeprazole (PRILOSEC) 20 MG delayed release capsule, Take 20 mg by mouth Daily , Disp: , Rfl:     alendronate (FOSAMAX) 70 MG tablet, Take 70 mg by mouth every 7 days , Disp: , Rfl:     Probiotic Product (PROBIOTIC DAILY PO), Take by mouth daily, Disp: , Rfl:     BIOTIN PO, Take by mouth daily, Disp: , Rfl:     ibuprofen (ADVIL;MOTRIN) 600 MG tablet, Take 1 tablet by mouth every 6 hours as needed for Pain., Disp: 60 tablet, Rfl: 1    Losartan Potassium-HCTZ (HYZAAR PO), Take 12.5 mg by mouth daily. , Disp: , Rfl:     amLODIPine (NORVASC) 5 MG tablet, Take 5 mg by mouth daily. , Disp: , Rfl:     Ascorbic Acid (VITAMIN C) 500 MG tablet, Take 500 mg by mouth daily. , Disp: , Rfl:     Omega-3 Fatty Acids (FISH OIL PO), Take  by mouth daily. , Disp: , Rfl:     GARLIC PO, Take  by mouth daily. , Disp: , Rfl:     simvastatin (ZOCOR) 20 MG tablet, Take 20 mg by mouth nightly., Disp: , Rfl:     Calcium Carbonate-Vitamin D (CALCIUM-VITAMIN D) 500-200 MG-UNIT per tablet, Take 1 tablet by mouth daily. , Disp: , Rfl:     Past Medical History  Elizabeth Taylor  has a past medical history of Hyperlipidemia, Hypertension, and Osteoarthritis. Social History  [facility]  reports that she has been smoking. She has a 14.00 pack-year smoking history. She has never used smokeless tobacco. She reports that she does not drink alcohol and does not use drugs. Family History  Jayde family history includes Diabetes in her mother; Heart Disease (age of onset: 72) in her father; High Blood Pressure in her mother; Stroke (age of onset: 67) in her mother. Past Surgical History   Past Surgical History:   Procedure Laterality Date    COLONOSCOPY      HYSTERECTOMY  9/03/2014    robotic assisted laprascopic hysterectomy    OTHER SURGICAL HISTORY  3/28/2014    posterior repair enterocele    OVARY SURGERY  2011    Mass on Ovary removed. Subjective:     REVIEW OF SYSTEMS  Constitutional: denies sweats, chills and fever  HENT: denies  congestion, sinus pressure, sneezing and sore throat. Eyes: denies  pain, discharge, redness and itching. Respiratory: denies apnea, cough  Gastrointestinal: denies blood in stool, constipation, diarrhea   Endocrine: denies cold intolerance, heat intolerance, polydipsia. Genitourinary: denies dysuria, enuresis, flank pain and hematuria. Musculoskeletal: denies arthralgias, joint swelling and neck pain. Neurological: denies numbness and headaches. Psychiatric/Behavioral: denies agitation, confusion, decreased concentration and dysphoric mood    All others reviewed and are negative. Objective:     /60   Pulse 96   Ht 5' 8\" (1.727 m)   Wt 135 lb (61.2 kg)   BMI 20.53 kg/m²     Wt Readings from Last 3 Encounters:   04/20/22 135 lb (61.2 kg)   01/12/22 134 lb (60.8 kg)   06/23/21 129 lb (58.5 kg)     BP Readings from Last 3 Encounters:   04/20/22 128/60   01/12/22 112/66   06/23/21 118/60       PHYSICAL EXAM  Constitutional: Oriented to person, place, and time. Appears well-developed and well-nourished. HENT:   Head: Normocephalic and atraumatic.    Eyes: EOM are normal. Pupils are equal, round, and reactive to light. Neck: Normal range of motion. Neck supple. No JVD present. Cardiovascular: Normal rate , MERCEDES 3/6 and intact distal pulses. Pulmonary/Chest: Effort normal and breath sounds normal. No respiratory distress. No wheezes. No rales. Abdominal: Soft. Bowel sounds are normal. No distension. There is no tenderness. Musculoskeletal: Normal range of motion. No edema. Neurological: Alert and oriented to person, place, and time. No cranial nerve deficit. Coordination normal.   Skin: Skin is warm and dry. Psychiatric: Normal mood and affect.        Lab Results   Component Value Date    CKTOTAL 83 01/17/2012       Lab Results   Component Value Date    WBC 8.4 09/03/2014    RBC 4.77 09/03/2014    RBC 4.80 01/17/2012    HGB 14.9 09/03/2014    HCT 44.2 09/03/2014    MCV 92.7 09/03/2014    MCH 31.3 09/03/2014    MCHC 33.8 09/03/2014    RDW 13.7 09/03/2014     09/03/2014    MPV 8.9 09/03/2014       Lab Results   Component Value Date     09/03/2014    K 3.7 09/03/2014     09/03/2014    CO2 29 09/03/2014    BUN 12 09/03/2014    CREATININE 0.7 09/03/2014    CALCIUM 9.5 09/03/2014    LABGLOM 82 09/03/2014    GLUCOSE 116 09/03/2014       No results found for: ALKPHOS, ALT, AST, PROT, BILITOT, BILIDIR, IBILI, LABALBU    No results found for: MG    Lab Results   Component Value Date    INR 0.93 09/03/2014    INR 0.93 03/28/2014         No results found for: LABA1C    No results found for: TRIG, HDL, LDLCALC, LDLDIRECT, LABVLDL    No results found for: TSH      Testing Reviewed:      I haveindividually reviewed the below cardiac tests    EKG:    ECHO:   Results for orders placed during the hospital encounter of 06/05/20   Echocardiogram 2D/ M-Mode/ Colorflow/ Do    Narrative Transthoracic Echocardiography Report (TTE)     Demographics      Patient Name   [facility]    Gender               Female                  Alex Hi      MR #           [id number] Race                                                       Ethnicity      Account #      [de-identified]        Room Number      Accession      [id number]        Date of Study        06/05/2020   Number      Date of Birth  1940       Referring Physician  Miki Reis      Age            [de-identified] year(s)       Sonographer          Audrey Matson RDCS                                      Interpreting         Echo reader of the                                   Physician            jack Portillo MD     Procedure    Type of Study      TTE procedure:ECHOCARDIOGRAM COMPLETE 2D W DOPPLER W COLOR. Procedure Date  Date: 06/05/2020 Start: 02:06 PM    Study Location: Echo Lab  Technical Quality: Adequate visualization    Indications: Aortic stenosis. Additional Medical History:Hypertension, Smoker, Osteoarthritis,  Hyperlipidemia. Patient Status: Routine    Height: 68 inches Weight: 149 pounds BSA: 1.8 m^2 BMI: 22.66 kg/m^2    BP: 132/70 mmHg     Conclusions      Summary   Normal left ventricle size and systolic function. Ejection fraction was   estimated at 60 %. There were no regional left ventricular wall motion   abnormalities and wall thickness was within normal limits. Doppler parameters were consistent with abnormal left ventricular   relaxation (grade 1 diastolic dysfunction). The left atrium is Moderately dilated. There is moderate aortic stenosis with valve area of 1.2 sq cm. The maximum aortic valve gradient is 30 mmHg, the mean gradient is 18   mmHg, and the peak velocity is 2.7 m/s. Signature      ----------------------------------------------------------------   Electronically signed by Mack Portillo MD (Interpreting   physician) on 06/05/2020 at 06:25 PM   ----------------------------------------------------------------      Findings      Mitral Valve   The mitral valve structure was normal with normal leaflet separation. DOPPLER: The transmitral velocity was within the normal range with no   evidence for mitral stenosis. Mild mitral regurgitation is present. Aortic Valve   Aortic valve appears tricuspid. Aortic valve leaflets are Moderately   calcified. Leaflets exhibited mild to moderately increased thickness and   mildly reduced cuspal separation of the aortic valve. Mild aortic   regurgitation is noted. There is moderate aortic stenosis with valve area of 1.2 sq cm. The maximum aortic valve gradient is 30 mmHg, the mean gradient is 18   mmHg, and the peak velocity is 2.7 m/s. Tricuspid Valve   The tricuspid valve structure was normal with normal leaflet separation. DOPPLER: There was no evidence of tricuspid stenosis. Mild tricuspid regurgitation visualized. Pulmonic Valve   The pulmonic valve leaflets exhibited normal thickness, no calcification,   and normal cuspal separation. DOPPLER: The transpulmonic velocity was   within the normal range with no evidence for regurgitation. Left Atrium   The left atrium is Moderately dilated. Left Ventricle   Normal left ventricle size and systolic function. Ejection fraction was   estimated at 60 %. There were no regional left ventricular wall motion   abnormalities and wall thickness was within normal limits. Doppler parameters were consistent with abnormal left ventricular   relaxation (grade 1 diastolic dysfunction). Right Atrium   Right atrial size was normal.      Right Ventricle   The right ventricular size was normal with normal systolic function and   wall thickness. Pericardial Effusion   The pericardium was normal in appearance with no evidence of a pericardial   effusion. Pleural Effusion   No evidence of pleural effusion. Aorta / Great Vessels   -Aortic root dimension within normal limits.   -The Pulmonary artery is within normal limits. -IVC size is within normal limits with normal respiratory phasic changes. M-Mode/2D Measurements & Calculations      LV Diastolic   LV Systolic Dimension:    AV Cusp Separation: 1.1 cmLA   Dimension: 3.4 2.4 cm                    Dimension: 3.1 cmAO Root   cm             LV Volume Diastolic: 87.2 Dimension: 3.4 cmLA Area: 14.4   LV FS:29.4 %   ml                        cm^2   LV PW          LV Volume Systolic: 51.4   Diastolic: 1.1 ml   cm             LV EDV/LV EDV Index: 47.4   Septum         ml/26 m^2LV ESV/LV ESV    RV Diastolic Dimension: 2.5 cm   Diastolic: 1.2 Index: 84.4 ml/11 m^2   cm             EF Calculated: 57.4 %     LA/Aorta: 0.91                                            Ascending Aorta: 3 cm                                            LA volume/Index: 32.8 ml /18m^2                     LVOT: 2 cm     Doppler Measurements & Calculations      MV Peak E-Wave: 70   AV Peak Velocity: 261    LVOT Peak Velocity: 99.2   cm/s                 cm/s                     cm/s   MV Peak A-Wave: 120  AV Peak Gradient: 27.25  LVOT Mean Velocity: 69.5   cm/s                 mmHg                     cm/s   MV E/A Ratio: 0.58   AV Mean Velocity: 190    LVOT Peak Gradient: 4   MV Peak Gradient:    cm/s                     mmHgLVOT Mean Gradient: 2   1.96 mmHg            AV Mean Gradient: 13     mmHg                        mmHg   MV Deceleration      AV VTI: 55.2 cm          TV Peak E-Wave: 45.4 cm/s   Time: 391 msec       AV Area                  TV Peak A-Wave: 41 cm/s                        (Continuity):1.16 cm^2                                                 TV Peak Gradient: 0.82 mmHg   MV E' Septal         LVOT VTI: 20.4 cm   Velocity: 3 cm/s     AV P1/2t: 441 msec       PV Peak Velocity: 66 cm/s   MV A' Septal         IVRT: 130 msec           PV Peak Gradient: 1.74 mmHg   Velocity: 7.9 cm/s   MV E' Lateral   Velocity: 4.5 cm/s   AV DVI (VTI): 0.37AV DVI   MV A' Lateral        (Vmax):0.38   Velocity: 11.3 cm/s   E/E' septal: 23.33   E/E' lateral: 15.56 [id number]       STRESS:    CATH:    Assessment/Plan       Diagnosis Orders   1. Severe aortic stenosis       Severe Aortic stenosis ELIAZAR 1, mean gradient of 40mm Hg, peak velocity 4.3 m/s  HTN  HLD  OA    States no symptoms but reports mild fatigue  Unable to exert due to her arthritis  Reviewed Echo which showed severe AS  Discussed TAVR/SAVR  Patient wants to monitor her symptoms for few months  Will follows up in 6 months with another echo  Reviewed carotid duplex  Also has neck pains, concerning for cervical arthrtis vs nerve impingement  Discussed about getting XR of cervical spine and follow up with PCP  The patient is asked to make an attempt to improve diet and exercise patterns to aid in medical management of this problem. Advised more plant based nutrition/meditarrean diet   Advised patient to call office or seek immediate medical attention if there is any new onset of  any chest pain, sob, palpitations, lightheadedness, dizziness, orthopnea, PND or pedal edema. All medication side effects were discussed in details. Thank you for allowing me to participate in the care of this patient. Please do not hesitate to contact me for any further questions. Return in about 6 months (around 10/20/2022), or if symptoms worsen or fail to improve, for Regular follow up, Review testing.        Electronically signed by Lucille Sanches MD [facility]  4/20/2022 at 9:32 AM EDT

## 2022-10-20 ENCOUNTER — HOSPITAL ENCOUNTER (OUTPATIENT)
Dept: NON INVASIVE DIAGNOSTICS | Age: 82
Discharge: HOME | End: 2022-10-20
Payer: MEDICARE

## 2022-10-20 DIAGNOSIS — I35.0 SEVERE AORTIC STENOSIS: ICD-10-CM

## 2022-10-20 PROCEDURE — 93306 TTE W/DOPPLER COMPLETE: CPT

## 2022-11-04 ENCOUNTER — HOSPITAL ENCOUNTER (OUTPATIENT)
Age: 82
Discharge: HOME | End: 2022-11-04
Payer: MEDICARE

## 2022-11-04 ENCOUNTER — HOSPITAL ENCOUNTER (OUTPATIENT)
Dept: GENERAL RADIOLOGY | Age: 82
Discharge: HOME | End: 2022-11-04
Payer: MEDICARE

## 2022-11-04 DIAGNOSIS — G89.29 OTHER CHRONIC PAIN: ICD-10-CM

## 2022-11-04 DIAGNOSIS — M25.511 RIGHT SHOULDER PAIN, UNSPECIFIED CHRONICITY: ICD-10-CM

## 2022-11-04 PROCEDURE — 73030 X-RAY EXAM OF SHOULDER: CPT

## 2022-12-09 ENCOUNTER — HOSPITAL ENCOUNTER (OUTPATIENT)
Dept: WOMENS IMAGING | Age: 82
Discharge: HOME | End: 2022-12-09
Payer: MEDICARE

## 2022-12-09 DIAGNOSIS — Z78.0 POSTMENOPAUSAL STATUS (AGE-RELATED) (NATURAL): ICD-10-CM

## 2022-12-09 PROCEDURE — 77080 DXA BONE DENSITY AXIAL: CPT

## 2022-12-14 ENCOUNTER — OFFICE VISIT (OUTPATIENT)
Dept: CARDIOLOGY CLINIC | Age: 82
End: 2022-12-14
Payer: MEDICARE

## 2022-12-14 VITALS
WEIGHT: 133 LBS | DIASTOLIC BLOOD PRESSURE: 64 MMHG | SYSTOLIC BLOOD PRESSURE: 132 MMHG | HEART RATE: 68 BPM | HEIGHT: 68 IN | BODY MASS INDEX: 20.16 KG/M2

## 2022-12-14 DIAGNOSIS — I35.0 AORTIC VALVE STENOSIS, ETIOLOGY OF CARDIAC VALVE DISEASE UNSPECIFIED: Primary | ICD-10-CM

## 2022-12-14 PROCEDURE — 1123F ACP DISCUSS/DSCN MKR DOCD: CPT | Performed by: INTERNAL MEDICINE

## 2022-12-14 PROCEDURE — G8482 FLU IMMUNIZE ORDER/ADMIN: HCPCS | Performed by: INTERNAL MEDICINE

## 2022-12-14 PROCEDURE — G8427 DOCREV CUR MEDS BY ELIG CLIN: HCPCS | Performed by: INTERNAL MEDICINE

## 2022-12-14 PROCEDURE — 99213 OFFICE O/P EST LOW 20 MIN: CPT | Performed by: INTERNAL MEDICINE

## 2022-12-14 PROCEDURE — G8420 CALC BMI NORM PARAMETERS: HCPCS | Performed by: INTERNAL MEDICINE

## 2022-12-14 PROCEDURE — 1090F PRES/ABSN URINE INCON ASSESS: CPT | Performed by: INTERNAL MEDICINE

## 2022-12-14 PROCEDURE — 3074F SYST BP LT 130 MM HG: CPT | Performed by: INTERNAL MEDICINE

## 2022-12-14 PROCEDURE — G8399 PT W/DXA RESULTS DOCUMENT: HCPCS | Performed by: INTERNAL MEDICINE

## 2022-12-14 PROCEDURE — 3078F DIAST BP <80 MM HG: CPT | Performed by: INTERNAL MEDICINE

## 2022-12-14 PROCEDURE — 4004F PT TOBACCO SCREEN RCVD TLK: CPT | Performed by: INTERNAL MEDICINE

## 2022-12-14 RX ORDER — ESTRADIOL 0.1 MG/G
2 CREAM VAGINAL DAILY
COMMUNITY

## 2022-12-14 RX ORDER — ZINC GLUCONATE 50 MG
50 TABLET ORAL DAILY
COMMUNITY

## 2022-12-14 NOTE — ADDENDUM NOTE
Addended by: Antoine Cost on: 12/14/2022 11:42 AM     Modules accepted: Orders
urology/will see patient in ED

## 2022-12-14 NOTE — PROGRESS NOTES
[address] [place] [address] [facility] [address]  Dept: [phone]  Dept Fax: [phone]  Loc: [phone]    Visit Date: 12/14/2022    Ms. Jessika Helton is a 80 y.o. female  who presented for:  Chief Complaint   Patient presents with    Check-Up     Severe aortic stenosis       HPI:   81 yo F c hx of Aortic stenosis, HTN, HLD and osteoarthritis is here for a follow up. She was previously seen by Dr. Geneva Burton in 2014. She underwent recent Echo in 6/5/20 which showed normal LVSF EF 60%, has severe AS is here for a follow up. On the last visit, she just wanted to monitor symptoms. Today she continues to not reports any symptoms and wants to monitor. Repeat echo showed no significant findings. Current Outpatient Medications:     zinc gluconate 50 MG tablet, Take 50 mg by mouth daily, Disp: , Rfl:     estradiol (ESTRACE VAGINAL) 0.1 MG/GM vaginal cream, Place 2 g vaginally daily, Disp: , Rfl:     NONFORMULARY, Vit B, Disp: , Rfl:     omeprazole (PRILOSEC) 20 MG delayed release capsule, Take 20 mg by mouth Daily , Disp: , Rfl:     alendronate (FOSAMAX) 70 MG tablet, Take 70 mg by mouth every 7 days , Disp: , Rfl:     Probiotic Product (PROBIOTIC DAILY PO), Take by mouth daily, Disp: , Rfl:     BIOTIN PO, Take by mouth daily, Disp: , Rfl:     ibuprofen (ADVIL;MOTRIN) 600 MG tablet, Take 1 tablet by mouth every 6 hours as needed for Pain., Disp: 60 tablet, Rfl: 1    Losartan Potassium-HCTZ (HYZAAR PO), Take 12.5 mg by mouth daily. , Disp: , Rfl:     amLODIPine (NORVASC) 5 MG tablet, Take 5 mg by mouth daily. , Disp: , Rfl:     Ascorbic Acid (VITAMIN C) 500 MG tablet, Take 500 mg by mouth daily. , Disp: , Rfl:     Omega-3 Fatty Acids (FISH OIL PO), Take  by mouth daily. , Disp: , Rfl:     GARLIC PO, Take  by mouth daily. , Disp: , Rfl:     simvastatin (ZOCOR) 20 MG tablet, Take 20 mg by mouth nightly., Disp: , Rfl:     Calcium Carbonate-Vitamin D (CALCIUM-VITAMIN D) 500-200 MG-UNIT per tablet, Take 1 tablet by mouth daily. , Disp: , Rfl:     Past Medical History  Maria Del Carmen Gardner  has a past medical history of Hyperlipidemia, Hypertension, and Osteoarthritis. Social History  Maria Del Carmen Gardner  reports that she has been smoking. She has a 14.00 pack-year smoking history. She has never used smokeless tobacco. She reports that she does not drink alcohol and does not use drugs. Family History  Jayde family history includes Diabetes in her mother; Heart Disease (age of onset: 72) in her father; High Blood Pressure in her mother; Stroke (age of onset: 67) in her mother. Past Surgical History   Past Surgical History:   Procedure Laterality Date    COLONOSCOPY      HYSTERECTOMY  9/03/2014    robotic assisted laprascopic hysterectomy    OTHER SURGICAL HISTORY  3/28/2014    posterior repair enterocele    OVARY SURGERY  2011    Mass on Ovary removed. Subjective:     REVIEW OF SYSTEMS  Constitutional: denies sweats, chills and fever  HENT: denies  congestion, sinus pressure, sneezing and sore throat. Eyes: denies  pain, discharge, redness and itching. Respiratory: denies apnea, cough  Gastrointestinal: denies blood in stool, constipation, diarrhea   Endocrine: denies cold intolerance, heat intolerance, polydipsia. Genitourinary: denies dysuria, enuresis, flank pain and hematuria. Musculoskeletal: denies arthralgias, joint swelling and neck pain. Neurological: denies numbness and headaches. Psychiatric/Behavioral: denies agitation, confusion, decreased concentration and dysphoric mood    All others reviewed and are negative.    Objective:     /64   Pulse 68   Ht 5' 8\" (1.727 m)   Wt 133 lb (60.3 kg)   BMI 20.22 kg/m²     Wt Readings from Last 3 Encounters:   12/14/22 133 lb (60.3 kg)   04/20/22 135 lb (61.2 kg)   01/12/22 134 lb (60.8 kg)     BP Readings from Last 3 Encounters:   12/14/22 132/64   04/20/22 128/60   01/12/22 112/66       PHYSICAL EXAM  Constitutional: Oriented to person, place, and time. Appears well-developed and well-nourished. HENT:   Head: Normocephalic and atraumatic. Eyes: EOM are normal. Pupils are equal, round, and reactive to light. Neck: Normal range of motion. Neck supple. No JVD present. Cardiovascular: Normal rate , MERCEDES 3/6 and intact distal pulses. Pulmonary/Chest: Effort normal and breath sounds normal. No respiratory distress. No wheezes. No rales. Abdominal: Soft. Bowel sounds are normal. No distension. There is no tenderness. Musculoskeletal: Normal range of motion. No edema. Neurological: Alert and oriented to person, place, and time. No cranial nerve deficit. Coordination normal.   Skin: Skin is warm and dry. Psychiatric: Normal mood and affect.        Lab Results   Component Value Date/Time    CKTOTAL 83 01/17/2012 10:18 AM       Lab Results   Component Value Date/Time    WBC 8.4 09/03/2014 08:06 AM    RBC 4.77 09/03/2014 08:06 AM    RBC 4.80 01/17/2012 10:18 AM    HGB 14.9 09/03/2014 08:06 AM    HCT 44.2 09/03/2014 08:06 AM    MCV 92.7 09/03/2014 08:06 AM    MCH 31.3 09/03/2014 08:06 AM    MCHC 33.8 09/03/2014 08:06 AM    RDW 13.7 09/03/2014 08:06 AM     09/03/2014 08:06 AM    MPV 8.9 09/03/2014 08:06 AM       Lab Results   Component Value Date/Time     09/03/2014 08:06 AM    K 3.7 09/03/2014 08:06 AM     09/03/2014 08:06 AM    CO2 29 09/03/2014 08:06 AM    BUN 12 09/03/2014 08:06 AM    CREATININE 0.7 09/03/2014 08:06 AM    CALCIUM 9.5 09/03/2014 08:06 AM    LABGLOM 82 09/03/2014 08:06 AM    GLUCOSE 116 09/03/2014 08:06 AM       No results found for: ALKPHOS, ALT, AST, PROT, BILITOT, BILIDIR, IBILI, LABALBU    No results found for: MG    Lab Results   Component Value Date    INR 0.93 09/03/2014    INR 0.93 03/28/2014         No results found for: LABA1C    No results found for: TRIG, HDL, LDLCALC, LDLDIRECT, LABVLDL    No results found for: TSH      Testing Reviewed:      I haveindividually reviewed the below cardiac tests    EKG:    ECHO:   Results for orders placed during the hospital encounter of 06/05/20   Echocardiogram 2D/ M-Mode/ Colorflow/ Do    Narrative Transthoracic Echocardiography Report (TTE)     Demographics      Patient Name   [facility]    Gender               Female                  MICHELLE      MR #           [id number]        Race                                                       Ethnicity      Account #      [de-identified]        Room Number      Accession      [id number]        Date of Study        06/05/2020   Number      Date of Birth  1940       Referring Physician  Xiomara Delgado      Age            [de-identified] year(s)       Sonographer          Meliza Thakkar RDCS                                      Interpreting         Echo reader of the                                   Physician            jack Louis MD     Procedure    Type of Study      TTE procedure:ECHOCARDIOGRAM COMPLETE 2D W DOPPLER W COLOR. Procedure Date  Date: 06/05/2020 Start: 02:06 PM    Study Location: Echo Lab  Technical Quality: Adequate visualization    Indications: Aortic stenosis. Additional Medical History:Hypertension, Smoker, Osteoarthritis,  Hyperlipidemia. Patient Status: Routine    Height: 68 inches Weight: 149 pounds BSA: 1.8 m^2 BMI: 22.66 kg/m^2    BP: 132/70 mmHg     Conclusions      Summary   Normal left ventricle size and systolic function. Ejection fraction was   estimated at 60 %. There were no regional left ventricular wall motion   abnormalities and wall thickness was within normal limits. Doppler parameters were consistent with abnormal left ventricular   relaxation (grade 1 diastolic dysfunction). The left atrium is Moderately dilated. There is moderate aortic stenosis with valve area of 1.2 sq cm. The maximum aortic valve gradient is 30 mmHg, the mean gradient is 18   mmHg, and the peak velocity is 2.7 m/s.       Signature ----------------------------------------------------------------   Electronically signed by Anahy Swenson MD (Interpreting   physician) on 06/05/2020 at 06:25 PM   ----------------------------------------------------------------      Findings      Mitral Valve   The mitral valve structure was normal with normal leaflet separation. DOPPLER: The transmitral velocity was within the normal range with no   evidence for mitral stenosis. Mild mitral regurgitation is present. Aortic Valve   Aortic valve appears tricuspid. Aortic valve leaflets are Moderately   calcified. Leaflets exhibited mild to moderately increased thickness and   mildly reduced cuspal separation of the aortic valve. Mild aortic   regurgitation is noted. There is moderate aortic stenosis with valve area of 1.2 sq cm. The maximum aortic valve gradient is 30 mmHg, the mean gradient is 18   mmHg, and the peak velocity is 2.7 m/s. Tricuspid Valve   The tricuspid valve structure was normal with normal leaflet separation. DOPPLER: There was no evidence of tricuspid stenosis. Mild tricuspid regurgitation visualized. Pulmonic Valve   The pulmonic valve leaflets exhibited normal thickness, no calcification,   and normal cuspal separation. DOPPLER: The transpulmonic velocity was   within the normal range with no evidence for regurgitation. Left Atrium   The left atrium is Moderately dilated. Left Ventricle   Normal left ventricle size and systolic function. Ejection fraction was   estimated at 60 %. There were no regional left ventricular wall motion   abnormalities and wall thickness was within normal limits. Doppler parameters were consistent with abnormal left ventricular   relaxation (grade 1 diastolic dysfunction). Right Atrium   Right atrial size was normal.      Right Ventricle   The right ventricular size was normal with normal systolic function and   wall thickness.       Pericardial Effusion   The pericardium was normal in appearance with no evidence of a pericardial   effusion. Pleural Effusion   No evidence of pleural effusion. Aorta / Great Vessels   -Aortic root dimension within normal limits.   -The Pulmonary artery is within normal limits. -IVC size is within normal limits with normal respiratory phasic changes.      M-Mode/2D Measurements & Calculations      LV Diastolic   LV Systolic Dimension:    AV Cusp Separation: 1.1 cmLA   Dimension: 3.4 2.4 cm                    Dimension: 3.1 cmAO Root   cm             LV Volume Diastolic: 16.1 Dimension: 3.4 cmLA Area: 14.4   LV FS:29.4 %   ml                        cm^2   LV PW          LV Volume Systolic: 39.5   Diastolic: 1.1 ml   cm             LV EDV/LV EDV Index: 47.4   Septum         ml/26 m^2LV ESV/LV ESV    RV Diastolic Dimension: 2.5 cm   Diastolic: 1.2 Index: 43.4 ml/11 m^2   cm             EF Calculated: 57.4 %     LA/Aorta: 0.91                                            Ascending Aorta: 3 cm                                            LA volume/Index: 32.8 ml /18m^2                     LVOT: 2 cm     Doppler Measurements & Calculations      MV Peak E-Wave: 70   AV Peak Velocity: 261    LVOT Peak Velocity: 99.2   cm/s                 cm/s                     cm/s   MV Peak A-Wave: 120  AV Peak Gradient: 27.25  LVOT Mean Velocity: 69.5   cm/s                 mmHg                     cm/s   MV E/A Ratio: 0.58   AV Mean Velocity: 190    LVOT Peak Gradient: 4   MV Peak Gradient:    cm/s                     mmHgLVOT Mean Gradient: 2   1.96 mmHg            AV Mean Gradient: 13     mmHg                        mmHg   MV Deceleration      AV VTI: 55.2 cm          TV Peak E-Wave: 45.4 cm/s   Time: 391 msec       AV Area                  TV Peak A-Wave: 41 cm/s                        (Continuity):1.16 cm^2                                                 TV Peak Gradient: 0.82 mmHg   MV E' Septal         LVOT VTI: 20.4 cm   Velocity: 3 cm/s     AV P1/2t: 441 msec       PV Peak Velocity: 66 cm/s   MV A' Septal         IVRT: 130 msec           PV Peak Gradient: 1.74 mmHg   Velocity: 7.9 cm/s   MV E' Lateral   Velocity: 4.5 cm/s   AV DVI (VTI): 0.37AV DVI   MV A' Lateral        (Vmax):0.38   Velocity: 11.3 cm/s   E/E' septal: 23.33   E/E' lateral: 15.56     [id number]       STRESS:    CATH:    Assessment/Plan       Diagnosis Orders   1. Aortic valve stenosis, etiology of cardiac valve disease unspecified            Severe Aortic stenosis ? ELIAZAR 1, mean gradient of 40mm Hg, peak velocity 4.3 m/s  HTN  HLD  OA    Recent Echo does not shows severe AS  ? poor quality echo  Discused RALPH but patient does not want to undergo it  Wants to get TTE again  Will repat another echo with Aortic valve measurements  At present she reports no symptoms  Discussed TAVR/SAVR  Patient wants to monitor her symptoms for few months  Reviewed carotid duplex  Also has neck pains, concerning for cervical arthrtis vs nerve impingement  Discussed about getting XR of cervical spine and follow up with PCP  The patient is asked to make an attempt to improve diet and exercise patterns to aid in medical management of this problem. Advised more plant based nutrition/meditarrean diet   Advised patient to call office or seek immediate medical attention if there is any new onset of  any chest pain, sob, palpitations, lightheadedness, dizziness, orthopnea, PND or pedal edema. All medication side effects were discussed in details. Thank you for allowing me to participate in the care of this patient. Please do not hesitate to contact me for any further questions. No follow-ups on file.        Electronically signed by Alphonzo Bernheim, MD [facility]  12/14/2022 at 9:32 AM EDT

## 2022-12-20 ENCOUNTER — HOSPITAL ENCOUNTER (OUTPATIENT)
Dept: INFUSION THERAPY | Age: 82
Discharge: HOME | End: 2022-12-20
Payer: MEDICARE

## 2022-12-20 ENCOUNTER — OFFICE VISIT (OUTPATIENT)
Dept: ONCOLOGY | Age: 82
End: 2022-12-20
Payer: MEDICARE

## 2022-12-20 VITALS
HEART RATE: 99 BPM | DIASTOLIC BLOOD PRESSURE: 60 MMHG | RESPIRATION RATE: 16 BRPM | SYSTOLIC BLOOD PRESSURE: 118 MMHG | TEMPERATURE: 98.7 F

## 2022-12-20 VITALS
HEIGHT: 68 IN | SYSTOLIC BLOOD PRESSURE: 118 MMHG | TEMPERATURE: 98.7 F | RESPIRATION RATE: 16 BRPM | OXYGEN SATURATION: 95 % | DIASTOLIC BLOOD PRESSURE: 60 MMHG | BODY MASS INDEX: 20.31 KG/M2 | WEIGHT: 134 LBS | HEART RATE: 99 BPM

## 2022-12-20 DIAGNOSIS — D72.829 LEUKOCYTOSIS, UNSPECIFIED TYPE: Primary | ICD-10-CM

## 2022-12-20 DIAGNOSIS — D72.829 LEUKOCYTOSIS, UNSPECIFIED TYPE: ICD-10-CM

## 2022-12-20 LAB
ABSOLUTE IMMATURE GRANULOCYTE: 0.01 THOU/MM3 (ref 0–0.07)
BASINOPHIL, AUTOMATED: 1 % (ref 0–3)
BASOPHILS ABSOLUTE: 0.1 THOU/MM3 (ref 0–0.1)
EOSINOPHILS ABSOLUTE: 0.1 THOU/MM3 (ref 0–0.4)
EOSINOPHILS RELATIVE PERCENT: 1 % (ref 0–4)
HCT VFR BLD CALC: 43.9 % (ref 37–47)
HEMOGLOBIN: 14.5 GM/DL (ref 12–16)
IMMATURE GRANULOCYTES: 0 %
LYMPHOCYTES # BLD: 20 % (ref 15–47)
LYMPHOCYTES ABSOLUTE: 2.1 THOU/MM3 (ref 1–4.8)
MCH RBC QN AUTO: 31.5 PG (ref 26–33)
MCHC RBC AUTO-ENTMCNC: 33 GM/DL (ref 32.2–35.5)
MCV RBC AUTO: 95 FL (ref 81–99)
MONOCYTES ABSOLUTE: 0.8 THOU/MM3 (ref 0.4–1.3)
MONOCYTES: 8 % (ref 0–12)
PDW BLD-RTO: 13.5 % (ref 11.5–14.5)
PLATELET # BLD: 288 THOU/MM3 (ref 130–400)
PMV BLD AUTO: 10.1 FL (ref 9.4–12.4)
RBC # BLD: 4.6 MILL/MM3 (ref 4.2–5.4)
SEG NEUTROPHILS: 71 % (ref 43–75)
SEGMENTED NEUTROPHILS ABSOLUTE COUNT: 7.5 THOU/MM3 (ref 1.8–7.7)
WBC # BLD: 10.6 THOU/MM3 (ref 4.8–10.8)

## 2022-12-20 PROCEDURE — 1090F PRES/ABSN URINE INCON ASSESS: CPT | Performed by: PHYSICIAN ASSISTANT

## 2022-12-20 PROCEDURE — G8427 DOCREV CUR MEDS BY ELIG CLIN: HCPCS | Performed by: PHYSICIAN ASSISTANT

## 2022-12-20 PROCEDURE — 1123F ACP DISCUSS/DSCN MKR DOCD: CPT | Performed by: PHYSICIAN ASSISTANT

## 2022-12-20 PROCEDURE — 4004F PT TOBACCO SCREEN RCVD TLK: CPT | Performed by: PHYSICIAN ASSISTANT

## 2022-12-20 PROCEDURE — 3078F DIAST BP <80 MM HG: CPT | Performed by: PHYSICIAN ASSISTANT

## 2022-12-20 PROCEDURE — 88184 FLOWCYTOMETRY/ TC 1 MARKER: CPT

## 2022-12-20 PROCEDURE — 3074F SYST BP LT 130 MM HG: CPT | Performed by: PHYSICIAN ASSISTANT

## 2022-12-20 PROCEDURE — 88185 FLOWCYTOMETRY/TC ADD-ON: CPT

## 2022-12-20 PROCEDURE — 99204 OFFICE O/P NEW MOD 45 MIN: CPT | Performed by: PHYSICIAN ASSISTANT

## 2022-12-20 PROCEDURE — 99211 OFF/OP EST MAY X REQ PHY/QHP: CPT

## 2022-12-20 PROCEDURE — 36415 COLL VENOUS BLD VENIPUNCTURE: CPT

## 2022-12-20 PROCEDURE — G8482 FLU IMMUNIZE ORDER/ADMIN: HCPCS | Performed by: PHYSICIAN ASSISTANT

## 2022-12-20 PROCEDURE — G8399 PT W/DXA RESULTS DOCUMENT: HCPCS | Performed by: PHYSICIAN ASSISTANT

## 2022-12-20 PROCEDURE — 85025 COMPLETE CBC W/AUTO DIFF WBC: CPT

## 2022-12-20 PROCEDURE — G8420 CALC BMI NORM PARAMETERS: HCPCS | Performed by: PHYSICIAN ASSISTANT

## 2022-12-20 RX ORDER — FLUTICASONE PROPIONATE 50 MCG
1 SPRAY, SUSPENSION (ML) NASAL
COMMUNITY
Start: 2022-11-15

## 2022-12-20 NOTE — PROGRESS NOTES
[facility], [address] [address],[address]  [address] [address]  Dept: [phone]  Dept Fax: [phone]: [phone]      Visit Date:12/20/2022     Nida Ulrich is a 80 y.o. female who presents today for:   Chief Complaint   Patient presents with    New Patient     NEUTROPHILIA        HPI:   Nida Ulrich is an 80 y.o. female referred to Hematology/Oncology clinic for evaluation of neutrophilia per her PCP, Leti Esparza CNP. The patient had CBC completed on 11/15/2022 which showed white blood cell count 14.2. She was referred for further evaluation. CBC also showed hemoglobin 15.5, hematocrit 46.9, MCV 94.5. Platelet count 007,827. The patient denies prior history of leukocytosis. She denies recent or recurrent infection. She denies history of COVID-19. She did receive COVID-19 booster on 10/13/2022. She denies development of B type symptoms; no unintentional weight loss, poor appetite, early satiety, lymphadenopathy or abdominal bloating. She denies history of autoimmune disorder or splenectomy. She denies recent steroid use. She is a current smoker of 1 to 2 cigarettes/day. Denies alcohol use. Past medical history includes hypertension, osteoarthritis. Past Medical History:   Diagnosis Date    H/O bladder infections     Hyperlipidemia     Hypertension     Osteoarthritis       Past Surgical History:   Procedure Laterality Date    COLONOSCOPY      HYSTERECTOMY (CERVIX STATUS UNKNOWN)  9/03/2014    robotic assisted laprascopic hysterectomy    OTHER SURGICAL HISTORY  3/28/2014    posterior repair enterocele    OVARY SURGERY  2011    Mass on Ovary removed. Family History   Problem Relation Age of Onset    Stroke Mother 67    Diabetes Mother     High Blood Pressure Mother     Heart Disease Father 72        Heart attack.     Kidney Disease Brother       Social History     Tobacco Use    Smoking status: Every Day     Packs/day: 0.25     Years: 56.00     Pack years: 14.00     Types: Cigarettes    Smokeless tobacco: Never    Tobacco comments:     1 cigarette per day   Substance Use Topics    Alcohol use: No      Current Outpatient Medications   Medication Sig Dispense Refill    fluticasone (FLONASE) 50 MCG/ACT nasal spray 1 spray by Nasal route      zinc gluconate 50 MG tablet Take 50 mg by mouth daily      estradiol (ESTRACE) 0.1 MG/GM vaginal cream Place 2 g vaginally daily      NONFORMULARY Vit B      omeprazole (PRILOSEC) 20 MG delayed release capsule Take 20 mg by mouth Daily       alendronate (FOSAMAX) 70 MG tablet Take 70 mg by mouth every 7 days       Probiotic Product (PROBIOTIC DAILY PO) Take 75 mg by mouth daily      BIOTIN PO Take 5,000 mg by mouth daily      ibuprofen (ADVIL;MOTRIN) 600 MG tablet Take 1 tablet by mouth every 6 hours as needed for Pain. 60 tablet 1    Losartan Potassium-HCTZ (HYZAAR PO) Take 12.5 mg by mouth daily. amLODIPine (NORVASC) 5 MG tablet Take 5 mg by mouth daily. Ascorbic Acid (VITAMIN C) 500 MG tablet Take 500 mg by mouth daily. Omega-3 Fatty Acids (FISH OIL PO) Take 1,000 mg by mouth daily      GARLIC PO Take 0,193 mg by mouth daily      simvastatin (ZOCOR) 20 MG tablet Take 20 mg by mouth nightly. Calcium Carbonate-Vitamin D (CALCIUM-VITAMIN D) 500-200 MG-UNIT per tablet Take 1 tablet by mouth daily. No current facility-administered medications for this visit. No Known Allergies       Review of Systems:   Review of Systems   Pertinent review of systems noted in HPI, all other ROS negative. Objective:   Physical Exam   /60 (Site: Right Upper Arm, Position: Sitting, Cuff Size: Medium Adult)   Pulse 99   Temp 98.7 °F (37.1 °C) (Oral)   Resp 16   Ht 5' 8\" (1.727 m)   Wt 134 lb (60.8 kg)   SpO2 95%   BMI 20.37 kg/m²    General appearance: No apparent distress, well developed, elderly and cooperative. HEENT: Pupils equal, round, and reactive to light. Conjunctivae/corneas clear.    Neck: Supple, with full range of motion. Trachea midline. Respiratory:  Normal respiratory effort. Clear to auscultation, bilaterally without Rales/Wheezes/Rhonchi. On room air, O2 sat 95%. Cardiovascular: Regular rate and rhythm with normal S1/S2   Abdomen: Soft, active bowel sounds. Musculoskeletal: No clubbing, cyanosis or edema bilaterally. Skin: Skin color, texture, turgor normal.  No visible rashes or lesions. Neurologic:  Neurovascularly intact without any focal sensory/motor deficits. Psychiatric: Alert and oriented      Imaging Studies and Labs:   CBC:   Lab Results   Component Value Date    WBC 10.6 12/20/2022    HGB 14.5 12/20/2022    HCT 43.9 12/20/2022    MCV 95 12/20/2022     12/20/2022     BMP:   Lab Results   Component Value Date/Time     09/03/2014 08:06 AM    K 3.7 09/03/2014 08:06 AM     09/03/2014 08:06 AM    CO2 29 09/03/2014 08:06 AM    BUN 12 09/03/2014 08:06 AM    CREATININE 0.7 09/03/2014 08:06 AM    GLUCOSE 116 09/03/2014 08:06 AM    CALCIUM 9.5 09/03/2014 08:06 AM      LFT: No results found for: ALT, AST, GGT, ALKPHOS, BILITOT      Assessment and Plan:   1. Leukocytosis  Noted on lab work completed 11/15/2022. WBC count 14.2. Hgb 15.5, hct 46.9, MCV 94. 5. platelet count 369,648. No recent infection or recurrent infection. Denies b-type symptoms. Denies history of COVID-19; however, did receive booster on 10/13/22 completed approximately 4 weeks prior to lab work. Denies autoimmune disorder, steroid use. Is a current smoker of 1-2 cigarettes/day.   -will obtain CBC and flow cytometry    -RTC in 4 weeks to review lab work    Return in about 4 weeks (around 1/17/2023). All patient questions answered. Pt voiced understanding. Patient agreed with treatment plan. Follow up as directed. Patient instructed to call for questions or concerns.       Electronically signed by   Ken Peña PA-C

## 2022-12-20 NOTE — PATIENT INSTRUCTIONS
Will obtain CBC, flow cytometry today  Return to clinic in 4 weeks   Labs on RTC  Please call for questions or concerns.

## 2022-12-23 LAB — LEUK/LYMPH PHENOTYPING WB: NORMAL

## 2023-01-11 ENCOUNTER — HOSPITAL ENCOUNTER (OUTPATIENT)
Dept: NON INVASIVE DIAGNOSTICS | Age: 83
Discharge: HOME | End: 2023-01-11
Payer: MEDICARE

## 2023-01-11 DIAGNOSIS — I35.0 AORTIC VALVE STENOSIS, ETIOLOGY OF CARDIAC VALVE DISEASE UNSPECIFIED: ICD-10-CM

## 2023-01-11 LAB
LV EF: 63 %
LVEF MODALITY: NORMAL

## 2023-01-11 PROCEDURE — 93306 TTE W/DOPPLER COMPLETE: CPT

## 2023-01-12 ENCOUNTER — TELEPHONE (OUTPATIENT)
Dept: CARDIOLOGY CLINIC | Age: 83
End: 2023-01-12

## 2023-01-12 DIAGNOSIS — R93.1 ABNORMAL ECHOCARDIOGRAM: Primary | ICD-10-CM

## 2023-01-12 NOTE — TELEPHONE ENCOUNTER
Reviewed ECHO results with Dr Bryan Kelly. Summary   Left ventricular size and systolic function is normal. Ejection fraction   was estimated at 60-65%. Mild concentric left ventricular hypertrophy. Mild intracavitary gradient   noted. The aortic valve leaflets were not well visualized. The valve appears   thickened and calcified. The peak velocity was noted to be 3.4 m/s, mean   gradient 25 mm Hg and calculated ELIAZAR was 0.9 cm2. There is moderate aortic   stenosis. Consider RALPH to evaluate further. Mild aortic regurgitation is not      Signature      ----------------------------------------------------------------   Electronically signed by Holley Oro MD (Interpreting   physician) on 01/11/2023 at 05:46 PM    Please order RALPH. Okay to move appointment p if patient would like to talk about it first.   Called patient. Phone rings busy x 3.

## 2023-01-13 NOTE — TELEPHONE ENCOUNTER
Spoke with patient to schedule RALPH for Friday, 1/20/23 at 11:00 am, pt to arrive at 9:30 am. All instructions reviewed via phone with patient and also mailed to patient this date; case scheduled in surgery with Ariella Naik, order faxed to x [phone].

## 2023-01-16 DIAGNOSIS — D72.829 LEUKOCYTOSIS, UNSPECIFIED TYPE: Primary | ICD-10-CM

## 2023-01-17 ENCOUNTER — HOSPITAL ENCOUNTER (OUTPATIENT)
Dept: INFUSION THERAPY | Age: 83
Discharge: HOME | End: 2023-01-17
Payer: MEDICARE

## 2023-01-17 ENCOUNTER — OFFICE VISIT (OUTPATIENT)
Dept: ONCOLOGY | Age: 83
End: 2023-01-17
Payer: MEDICARE

## 2023-01-17 VITALS
TEMPERATURE: 98.7 F | HEART RATE: 95 BPM | RESPIRATION RATE: 18 BRPM | DIASTOLIC BLOOD PRESSURE: 74 MMHG | SYSTOLIC BLOOD PRESSURE: 122 MMHG

## 2023-01-17 VITALS
HEART RATE: 95 BPM | TEMPERATURE: 98.7 F | DIASTOLIC BLOOD PRESSURE: 74 MMHG | OXYGEN SATURATION: 95 % | BODY MASS INDEX: 20.16 KG/M2 | SYSTOLIC BLOOD PRESSURE: 122 MMHG | RESPIRATION RATE: 18 BRPM | WEIGHT: 133 LBS | HEIGHT: 68 IN

## 2023-01-17 DIAGNOSIS — D72.829 LEUKOCYTOSIS, UNSPECIFIED TYPE: Primary | ICD-10-CM

## 2023-01-17 DIAGNOSIS — D72.829 LEUKOCYTOSIS, UNSPECIFIED TYPE: ICD-10-CM

## 2023-01-17 LAB
ABSOLUTE IMMATURE GRANULOCYTE: 0.02 THOU/MM3 (ref 0–0.07)
BASINOPHIL, AUTOMATED: 1 % (ref 0–3)
BASOPHILS ABSOLUTE: 0.1 THOU/MM3 (ref 0–0.1)
EOSINOPHILS ABSOLUTE: 0.2 THOU/MM3 (ref 0–0.4)
EOSINOPHILS RELATIVE PERCENT: 2 % (ref 0–4)
HCT VFR BLD CALC: 44.5 % (ref 37–47)
HEMOGLOBIN: 14.3 GM/DL (ref 12–16)
IMMATURE GRANULOCYTES: 0 %
LYMPHOCYTES # BLD: 19 % (ref 15–47)
LYMPHOCYTES ABSOLUTE: 2.1 THOU/MM3 (ref 1–4.8)
MCH RBC QN AUTO: 31.2 PG (ref 26–33)
MCHC RBC AUTO-ENTMCNC: 32.1 GM/DL (ref 32.2–35.5)
MCV RBC AUTO: 97 FL (ref 81–99)
MONOCYTES ABSOLUTE: 1 THOU/MM3 (ref 0.4–1.3)
MONOCYTES: 9 % (ref 0–12)
PDW BLD-RTO: 13.4 % (ref 11.5–14.5)
PLATELET # BLD: 298 THOU/MM3 (ref 130–400)
PMV BLD AUTO: 10.3 FL (ref 9.4–12.4)
RBC # BLD: 4.59 MILL/MM3 (ref 4.2–5.4)
SEG NEUTROPHILS: 69 % (ref 43–75)
SEGMENTED NEUTROPHILS ABSOLUTE COUNT: 7.4 THOU/MM3 (ref 1.8–7.7)
WBC # BLD: 10.8 THOU/MM3 (ref 4.8–10.8)

## 2023-01-17 PROCEDURE — 3075F SYST BP GE 130 - 139MM HG: CPT | Performed by: PHYSICIAN ASSISTANT

## 2023-01-17 PROCEDURE — G8482 FLU IMMUNIZE ORDER/ADMIN: HCPCS | Performed by: PHYSICIAN ASSISTANT

## 2023-01-17 PROCEDURE — 85025 COMPLETE CBC W/AUTO DIFF WBC: CPT

## 2023-01-17 PROCEDURE — 1123F ACP DISCUSS/DSCN MKR DOCD: CPT | Performed by: PHYSICIAN ASSISTANT

## 2023-01-17 PROCEDURE — 1090F PRES/ABSN URINE INCON ASSESS: CPT | Performed by: PHYSICIAN ASSISTANT

## 2023-01-17 PROCEDURE — 4004F PT TOBACCO SCREEN RCVD TLK: CPT | Performed by: PHYSICIAN ASSISTANT

## 2023-01-17 PROCEDURE — 3078F DIAST BP <80 MM HG: CPT | Performed by: PHYSICIAN ASSISTANT

## 2023-01-17 PROCEDURE — G8420 CALC BMI NORM PARAMETERS: HCPCS | Performed by: PHYSICIAN ASSISTANT

## 2023-01-17 PROCEDURE — 99214 OFFICE O/P EST MOD 30 MIN: CPT | Performed by: PHYSICIAN ASSISTANT

## 2023-01-17 PROCEDURE — G8427 DOCREV CUR MEDS BY ELIG CLIN: HCPCS | Performed by: PHYSICIAN ASSISTANT

## 2023-01-17 PROCEDURE — 36415 COLL VENOUS BLD VENIPUNCTURE: CPT

## 2023-01-17 PROCEDURE — 99211 OFF/OP EST MAY X REQ PHY/QHP: CPT

## 2023-01-17 PROCEDURE — G8399 PT W/DXA RESULTS DOCUMENT: HCPCS | Performed by: PHYSICIAN ASSISTANT

## 2023-01-17 NOTE — PROGRESS NOTES
[facility], [address] [facility] [address]  [address] [address]  Dept: [phone]  Dept Fax: [phone]: [phone]      Visit Date:1/17/2023     Roberto Donis is a 80 y.o. female who presents today for:   Chief Complaint   Patient presents with    Follow-up     Leukocytosis, unspecified type        HPI:   Roberto Donis is a 80 y.o. female referred to Hematology/Oncology clinic for evaluation of neutrophilia per her PCP, Dimitry Yepez CNP. She was seen as a new patient on 12/20/2022. The patient had CBC completed on 11/15/2022 which showed white blood cell count 14.2. She was referred for further evaluation. CBC also showed hemoglobin 15.5, hematocrit 46.9, MCV 94.5. Platelet count 136,814. The patient denies prior history of leukocytosis. She denies recent or recurrent infection. She denies history of COVID-19. She did receive COVID-19 booster on 10/13/2022. She denies development of B type symptoms; no unintentional weight loss, poor appetite, early satiety, lymphadenopathy or abdominal bloating. She denies history of autoimmune disorder or splenectomy. She denies recent steroid use. She is a current smoker of 1 to 2 cigarettes/day. Denies alcohol use. Past medical history includes hypertension, osteoarthritis. Interval History 1/17/2023:   Patient is here for follow-up evaluation of leukocytosis. She is here with her daughter today. The patient denies any changes in her overall health since her last visit. Denies ED visits or hospitalizations. She affirms recent sinus infection and completed antibiotic. She is unsure of antibiotic. Denies unintentional weight loss, poor appetite, early satiety, abdominal bloating or new bone pain. PMH, SH, and FH:  I reviewed the patients medication list and allergy list as noted on the electronic medical record. The PMH, SH and FH were also reviewed as noted on the EMR.       Review of Systems:   Review of Systems   Pertinent review of systems noted in HPI, all other ROS negative. Objective:   Physical Exam   /74 (Site: Right Upper Arm, Position: Sitting, Cuff Size: Medium Adult)   Pulse 95   Temp 98.7 °F (37.1 °C) (Oral)   Resp 18   Ht 5' 8\" (1.727 m)   Wt 133 lb (60.3 kg)   SpO2 95%   BMI 20.22 kg/m²    General appearance: No apparent distress, well developed, elderly, and cooperative. HEENT: Pupils equal, round, and reactive to light. Conjunctivae/corneas clear. Oral mucosa moist.   Neck: Supple, with full range of motion. Trachea midline. Respiratory:  Normal respiratory effort. Clear to auscultation, bilaterally without Rales/Wheezes/Rhonchi. Cardiovascular: Regular rate and rhythm with normal S1/S2   Abdomen: Soft, active bowel sounds. Musculoskeletal: No clubbing, cyanosis or edema bilaterally. Ambulates in office. Skin: Skin color, texture, turgor normal.  No visible rashes or lesions. Neurologic:  Neurovascularly intact without any focal sensory/motor deficits. Psychiatric: Alert and oriented      Imaging Studies and Labs:   CBC:   Lab Results   Component Value Date    WBC 10.8 01/17/2023    HGB 14.3 01/17/2023    HCT 44.5 01/17/2023    MCV 97 01/17/2023     01/17/2023     BMP:   Lab Results   Component Value Date/Time     09/03/2014 08:06 AM    K 3.7 09/03/2014 08:06 AM     09/03/2014 08:06 AM    CO2 29 09/03/2014 08:06 AM    BUN 12 09/03/2014 08:06 AM    CREATININE 0.7 09/03/2014 08:06 AM    GLUCOSE 116 09/03/2014 08:06 AM    CALCIUM 9.5 09/03/2014 08:06 AM      Component 12/20/22 1137    LEUK/LYMPH PHENOTYPING WB SEE BELOW    Comment: Leuk/Lymph Phenotype, Source                 Blood   Number Of Markers                               27                   marker   Leuk/Lymph Phenotype, Impression          see note   SAMPLE: PERIPHERAL BLOOD     IMPRESSION:   1. Decreased CD33 expression among myeloid lineage cells. See   comment.    2. No abnormal B cell, T cell, or NK cell population identified. See comment. COMMENT:   Decreased CD33 in isolation is a nonspecific finding and may   reflect either a normal genetic variant or an underlying myeloid   neoplasm. Morphologic correlation is required. As flow cytometry is an ancillary study, clinical correlation will   also be required to make the final diagnosis. Assessment and Plan:   1. Leukocytosis  Noted on lab work completed 11/15/2022. WBC count 14.2. Hgb 15.5, hct 46.9, MCV 94. 5. platelet count 837,020. No recent infection or recurrent infection. Denies b-type symptoms. Denies history of COVID-19; however, did receive booster on 10/13/22 completed approximately 4 weeks prior to lab work. Denies autoimmune disorder, steroid use. Is a current smoker of 1-2 cigarettes/day. CBC on 12/20/2022 WBC count 10.6. Hgb, Hgb, MCV and platelet count within normal limits. Leukocytosis resolved. Today on 1/17/2023, WBC count 10.8. Patient recently had acute sinusitis and treated with antibiotic. Hgb 14.3, Hgb 44.5, MCV 97, platelet count 775,353. Flow cytometry showed no abnormal b cell, T cell or NK cell population identified. Decreased CD33 expression among myeloid lineage cells. May reflect either a normal genetic variant or underlying myeloid neoplasm. WBC count, Hgb/Hct, MCV and platelet count within normal limits.    -Will continue to monitor WBC count and overall blood counts    -Instructed to monitor for development of b-type symptoms   -RTC in 3 months. Return in about 3 months (around 4/17/2023). All patient questions answered. Pt voiced understanding. Patient agreed with treatment plan. Follow up as directed. Patient instructed to call for questions or concerns.           Electronically signed by   Angela Andujar PA-C

## 2023-01-20 ENCOUNTER — HOSPITAL ENCOUNTER (OUTPATIENT)
Age: 83
Setting detail: OUTPATIENT SURGERY
Discharge: HOME | End: 2023-01-20
Attending: INTERNAL MEDICINE | Admitting: INTERNAL MEDICINE
Payer: MEDICARE

## 2023-01-20 VITALS
WEIGHT: 133 LBS | HEART RATE: 57 BPM | HEIGHT: 67 IN | RESPIRATION RATE: 16 BRPM | BODY MASS INDEX: 20.88 KG/M2 | DIASTOLIC BLOOD PRESSURE: 62 MMHG | OXYGEN SATURATION: 97 % | TEMPERATURE: 96.7 F | SYSTOLIC BLOOD PRESSURE: 135 MMHG

## 2023-01-20 DIAGNOSIS — R93.1 ABNORMAL ECHOCARDIOGRAM: ICD-10-CM

## 2023-01-20 LAB
LV EF: 63 %
LVEF MODALITY: NORMAL

## 2023-01-20 PROCEDURE — 99152 MOD SED SAME PHYS/QHP 5/>YRS: CPT | Performed by: INTERNAL MEDICINE

## 2023-01-20 PROCEDURE — 93325 DOPPLER ECHO COLOR FLOW MAPG: CPT

## 2023-01-20 PROCEDURE — 93312 ECHO TRANSESOPHAGEAL: CPT

## 2023-01-20 PROCEDURE — 93312 ECHO TRANSESOPHAGEAL: CPT | Performed by: INTERNAL MEDICINE

## 2023-01-20 PROCEDURE — 2709999900 HC NON-CHARGEABLE SUPPLY: Performed by: INTERNAL MEDICINE

## 2023-01-20 PROCEDURE — 6370000000 HC RX 637 (ALT 250 FOR IP)

## 2023-01-20 PROCEDURE — 93320 DOPPLER ECHO COMPLETE: CPT

## 2023-01-20 PROCEDURE — 2580000003 HC RX 258: Performed by: INTERNAL MEDICINE

## 2023-01-20 PROCEDURE — 7100000010 HC PHASE II RECOVERY - FIRST 15 MIN: Performed by: INTERNAL MEDICINE

## 2023-01-20 PROCEDURE — 6360000002 HC RX W HCPCS: Performed by: INTERNAL MEDICINE

## 2023-01-20 PROCEDURE — 7100000011 HC PHASE II RECOVERY - ADDTL 15 MIN: Performed by: INTERNAL MEDICINE

## 2023-01-20 RX ORDER — SODIUM CHLORIDE 0.9 % (FLUSH) 0.9 %
5-40 SYRINGE (ML) INJECTION EVERY 12 HOURS SCHEDULED
Status: DISCONTINUED | OUTPATIENT
Start: 2023-01-20 | End: 2023-01-20 | Stop reason: HOSPADM

## 2023-01-20 RX ORDER — FENTANYL CITRATE 50 UG/ML
INJECTION, SOLUTION INTRAMUSCULAR; INTRAVENOUS PRN
Status: DISCONTINUED | OUTPATIENT
Start: 2023-01-20 | End: 2023-01-20 | Stop reason: ALTCHOICE

## 2023-01-20 RX ORDER — SODIUM CHLORIDE 9 MG/ML
INJECTION, SOLUTION INTRAVENOUS CONTINUOUS
Status: DISCONTINUED | OUTPATIENT
Start: 2023-01-20 | End: 2023-01-20 | Stop reason: HOSPADM

## 2023-01-20 RX ORDER — SODIUM CHLORIDE 0.9 % (FLUSH) 0.9 %
5-40 SYRINGE (ML) INJECTION PRN
Status: DISCONTINUED | OUTPATIENT
Start: 2023-01-20 | End: 2023-01-20 | Stop reason: HOSPADM

## 2023-01-20 RX ORDER — SODIUM CHLORIDE 9 MG/ML
25 INJECTION, SOLUTION INTRAVENOUS PRN
Status: DISCONTINUED | OUTPATIENT
Start: 2023-01-20 | End: 2023-01-20 | Stop reason: HOSPADM

## 2023-01-20 RX ORDER — MIDAZOLAM HYDROCHLORIDE 1 MG/ML
INJECTION INTRAMUSCULAR; INTRAVENOUS PRN
Status: DISCONTINUED | OUTPATIENT
Start: 2023-01-20 | End: 2023-01-20 | Stop reason: ALTCHOICE

## 2023-01-20 RX ADMIN — SODIUM CHLORIDE: 9 INJECTION, SOLUTION INTRAVENOUS at 09:31

## 2023-01-20 ASSESSMENT — PAIN SCALES - GENERAL: PAINLEVEL_OUTOF10: 0

## 2023-01-20 NOTE — H&P
[address] [address]  Sedation/Analgesia History & Physical    Pt Name: Stefano Taylor  Account number: [de-identified]  MRN: [id number]  YOB: 1940  Provider Performing Procedure: Aidan Champagne MD MD [facility]  Primary Care Physician: Matt Yousif, APRN - [facility]  Date: 1/20/2023    PRE-PROCEDURE    Code Status: FULL CODE  Brief History/Pre-Procedure Diagnosis: AS evaluation    Consent: : I have discussed with the patient risks, benefits, and alternatives (and relevant risks, benefits, and side effects related to alternatives or not receiving care), and likelihood of the success. The patient and/or representative appear to understand and agree to proceed. The discussion encompasses risks, benefits, and side effects related to the alternatives and the risks related to not receiving the proposed care, treatment, and services. PLANNED PROCEDURE   []Cath  []PCI                []Pacemaker/AICD  [x]RALPH             []Cardioversion []Peripheral angiography/PTA  []Other:      VITAL SIGNS   Vitals:    01/20/23 0929   BP: (!) 162/74   Pulse: 84   Temp: 97.2 °F (36.2 °C)   SpO2: 96%       PHYSICAL:   General: No acute distress  HEENT:  Unremarkable for age  Neck: without increased JVD, carotid pulses 2+ bilaterally without bruits  Heart: RRR, S1 & S2 WNL   Lungs: Clear to auscultation    Abdomen: BS present, without HSM, masses, or tenderness    Extremities: without C,C,E.  Pulses 2+ bilaterally  Mental Status: Alert & Oriented    SEDATION  Planned agent:[x]Midazolam []Meperidine []Sublimaze []Morphine  []Diazepam  [x]Other: fentanyl      ASA Classification:  []1 []2 [x]3 []4 []5  Class 1: A normal healthy patient  Class 2: Pt with mild to moderate systemic disease  Class 3: Severe systemic disease or disturbance  Class 4: Severe systemic disorders that are already life threatening. Class 5: Moribund pt with little chances of survival, for more than 24 hours.   Mallampati I Airway Classification:   []1 []2 [x]3 []4          MEDICAL HISTORY   has a past medical history of H/O bladder infections, Hyperlipidemia, Hypertension, and Osteoarthritis. []Additional information:          SURGICAL HISTORY   has a past surgical history that includes Ovary surgery (2011); Colonoscopy; other surgical history (3/28/2014); and Hysterectomy (9/03/2014). Additional information:       ALLERGIES   Allergies as of 01/13/2023    (No Known Allergies)     Additional information:       MEDICATIONS     Current Facility-Administered Medications:     sodium chloride flush 0.9 % injection 5-40 mL, 5-40 mL, IntraVENous, 2 times per day, Kirill Ovalles MD    sodium chloride flush 0.9 % injection 5-40 mL, 5-40 mL, IntraVENous, PRN, Kirill Ovalles MD    0.9 % sodium chloride infusion, 25 mL, IntraVENous, PRN, Kirill Ovalles MD    0.9 % sodium chloride infusion, , IntraVENous, Continuous, Kirill Ovalles MD, Last Rate: 75 mL/hr at 01/20/23 0931, New Bag at 01/20/23 0931  Prior to Admission medications    Medication Sig Start Date End Date Taking? Authorizing Provider   fluticasone (FLONASE) 50 MCG/ACT nasal spray 1 spray by Nasal route 11/15/22   Historical Provider, MD   zinc gluconate 50 MG tablet Take 50 mg by mouth daily    Historical Provider, MD   estradiol (ESTRACE) 0.1 MG/GM vaginal cream Place 2 g vaginally daily    Historical Provider, MD   NONFORMULARY Vit B    Historical Provider, MD   omeprazole (PRILOSEC) 20 MG delayed release capsule Take 20 mg by mouth Daily  9/16/20   Historical Provider, MD   alendronate (FOSAMAX) 70 MG tablet Take 70 mg by mouth every 7 days  8/11/20   Historical Provider, MD   Probiotic Product (PROBIOTIC DAILY PO) Take 75 mg by mouth daily    Historical Provider, MD   BIOTIN PO Take 5,000 mg by mouth daily    Historical Provider, MD   ibuprofen (ADVIL;MOTRIN) 600 MG tablet Take 1 tablet by mouth every 6 hours as needed for Pain.  9/3/14   Tan Gage DO   Losartan Potassium-HCTZ (HYZAAR PO) Take 12.5 mg by mouth daily. Historical Provider, MD   amLODIPine (NORVASC) 5 MG tablet Take 5 mg by mouth daily. Historical Provider, MD   Ascorbic Acid (VITAMIN C) 500 MG tablet Take 500 mg by mouth daily. Historical Provider, MD   Omega-3 Fatty Acids (FISH OIL PO) Take 1,000 mg by mouth daily    Historical Provider, MD   GARLIC PO Take 0,230 mg by mouth daily    Historical Provider, MD   simvastatin (ZOCOR) 20 MG tablet Take 20 mg by mouth nightly. Historical Provider, MD   Calcium Carbonate-Vitamin D (CALCIUM-VITAMIN D) 500-200 MG-UNIT per tablet Take 1 tablet by mouth daily.     Historical Provider, MD     Additional information:                 Jose Duggan MD MD [facility]  Electronically signed 1/20/2023 at 11:01 AM

## 2023-01-20 NOTE — PROGRESS NOTES
Discharge instructions reviewed with pt and daughter- verbalized understanding. Pt discharged home in stable condition. Driven per daughter.

## 2023-02-15 ENCOUNTER — OFFICE VISIT (OUTPATIENT)
Dept: CARDIOLOGY CLINIC | Age: 83
End: 2023-02-15
Payer: MEDICARE

## 2023-02-15 VITALS
HEART RATE: 62 BPM | DIASTOLIC BLOOD PRESSURE: 70 MMHG | WEIGHT: 133 LBS | BODY MASS INDEX: 20.88 KG/M2 | SYSTOLIC BLOOD PRESSURE: 136 MMHG | HEIGHT: 67 IN

## 2023-02-15 DIAGNOSIS — R06.02 SOB (SHORTNESS OF BREATH): Primary | ICD-10-CM

## 2023-02-15 PROCEDURE — 1123F ACP DISCUSS/DSCN MKR DOCD: CPT | Performed by: INTERNAL MEDICINE

## 2023-02-15 PROCEDURE — G8428 CUR MEDS NOT DOCUMENT: HCPCS | Performed by: INTERNAL MEDICINE

## 2023-02-15 PROCEDURE — 1090F PRES/ABSN URINE INCON ASSESS: CPT | Performed by: INTERNAL MEDICINE

## 2023-02-15 PROCEDURE — 93000 ELECTROCARDIOGRAM COMPLETE: CPT | Performed by: INTERNAL MEDICINE

## 2023-02-15 PROCEDURE — G8482 FLU IMMUNIZE ORDER/ADMIN: HCPCS | Performed by: INTERNAL MEDICINE

## 2023-02-15 PROCEDURE — 99213 OFFICE O/P EST LOW 20 MIN: CPT | Performed by: INTERNAL MEDICINE

## 2023-02-15 PROCEDURE — G8399 PT W/DXA RESULTS DOCUMENT: HCPCS | Performed by: INTERNAL MEDICINE

## 2023-02-15 PROCEDURE — 4004F PT TOBACCO SCREEN RCVD TLK: CPT | Performed by: INTERNAL MEDICINE

## 2023-02-15 PROCEDURE — 3078F DIAST BP <80 MM HG: CPT | Performed by: INTERNAL MEDICINE

## 2023-02-15 PROCEDURE — G8420 CALC BMI NORM PARAMETERS: HCPCS | Performed by: INTERNAL MEDICINE

## 2023-02-15 PROCEDURE — 3075F SYST BP GE 130 - 139MM HG: CPT | Performed by: INTERNAL MEDICINE

## 2023-02-15 NOTE — PROGRESS NOTES
[address] [place], [place] [address] [facility] [address]  Dept: [phone]  Dept Fax: [phone]  Loc: [phone]    Visit Date: 2/15/2023    Ms. Gerardo Huffman is a 80 y.o. female  who presented for:  Chief Complaint   Patient presents with    Check-Up     Aortic stenosis       HPI:   79 yo F c hx of Aortic stenosis, HTN, HLD and osteoarthritis is here for a follow up. She was previously seen by Dr. Cornelius July in 2014. She underwent recent Echo in 6/5/20 which showed normal LVSF EF 60%, has severe AS is here for a follow up. On the last visit, she just wanted to monitor symptoms. Today she continues to not reports any symptoms and wants to monitor. Repeat echo showed no significant findings. Current Outpatient Medications:     fluticasone (FLONASE) 50 MCG/ACT nasal spray, 1 spray by Nasal route, Disp: , Rfl:     zinc gluconate 50 MG tablet, Take 50 mg by mouth daily, Disp: , Rfl:     estradiol (ESTRACE) 0.1 MG/GM vaginal cream, Place 2 g vaginally daily, Disp: , Rfl:     NONFORMULARY, Vit B, Disp: , Rfl:     omeprazole (PRILOSEC) 20 MG delayed release capsule, Take 20 mg by mouth Daily , Disp: , Rfl:     alendronate (FOSAMAX) 70 MG tablet, Take 70 mg by mouth every 7 days , Disp: , Rfl:     Probiotic Product (PROBIOTIC DAILY PO), Take 75 mg by mouth daily, Disp: , Rfl:     BIOTIN PO, Take 5,000 mg by mouth daily, Disp: , Rfl:     ibuprofen (ADVIL;MOTRIN) 600 MG tablet, Take 1 tablet by mouth every 6 hours as needed for Pain., Disp: 60 tablet, Rfl: 1    Losartan Potassium-HCTZ (HYZAAR PO), Take 12.5 mg by mouth daily. , Disp: , Rfl:     amLODIPine (NORVASC) 5 MG tablet, Take 5 mg by mouth daily. , Disp: , Rfl:     Ascorbic Acid (VITAMIN C) 500 MG tablet, Take 500 mg by mouth daily. , Disp: , Rfl:     Omega-3 Fatty Acids (FISH OIL PO), Take 1,000 mg by mouth daily, Disp: , Rfl:     GARLIC PO, Take 4,209 mg by mouth daily, Disp: , Rfl:     simvastatin (ZOCOR) 20 MG tablet, Take 20 mg by mouth nightly., Disp: , Rfl:     Calcium Carbonate-Vitamin D (CALCIUM-VITAMIN D) 500-200 MG-UNIT per tablet, Take 1 tablet by mouth daily. , Disp: , Rfl:     Past Medical History  Emmanuel Peralta  has a past medical history of H/O bladder infections, Hyperlipidemia, Hypertension, and Osteoarthritis. Social History  Emmanuel Peralta  reports that she has been smoking cigarettes. She has a 14.00 pack-year smoking history. She has never used smokeless tobacco. She reports that she does not drink alcohol and does not use drugs. Family History  Jayde family history includes Diabetes in her mother; Heart Disease (age of onset: 72) in her father; High Blood Pressure in her mother; Kidney Disease in her brother; Stroke (age of onset: 67) in her mother. Past Surgical History   Past Surgical History:   Procedure Laterality Date    COLONOSCOPY      HYSTERECTOMY (CERVIX STATUS UNKNOWN)  9/03/2014    robotic assisted laprascopic hysterectomy    OTHER SURGICAL HISTORY  3/28/2014    posterior repair enterocele    OVARY SURGERY  2011    Mass on Ovary removed. TRANSESOPHAGEAL ECHOCARDIOGRAM N/A 1/20/2023    TRANSESOPHAGEAL ECHOCARDIOGRAM performed by Ricky Duvall MD at [facility]       Subjective:     REVIEW OF SYSTEMS  Constitutional: denies sweats, chills and fever  HENT: denies  congestion, sinus pressure, sneezing and sore throat. Eyes: denies  pain, discharge, redness and itching. Respiratory: denies apnea, cough  Gastrointestinal: denies blood in stool, constipation, diarrhea   Endocrine: denies cold intolerance, heat intolerance, polydipsia. Genitourinary: denies dysuria, enuresis, flank pain and hematuria. Musculoskeletal: denies arthralgias, joint swelling and neck pain. Neurological: denies numbness and headaches. Psychiatric/Behavioral: denies agitation, confusion, decreased concentration and dysphoric mood    All others reviewed and are negative.    Objective:     /70   Pulse 62   Ht 5' 7\" (1.702 m)   Wt 133 lb (60.3 kg)   BMI 20.83 kg/m²     Wt Readings from Last 3 Encounters:   02/15/23 133 lb (60.3 kg)   01/20/23 133 lb (60.3 kg)   01/17/23 133 lb (60.3 kg)     BP Readings from Last 3 Encounters:   02/15/23 136/70   01/20/23 135/62   01/17/23 122/74       PHYSICAL EXAM  Constitutional: Oriented to person, place, and time. Appears well-developed and well-nourished. HENT:   Head: Normocephalic and atraumatic. Eyes: EOM are normal. Pupils are equal, round, and reactive to light. Neck: Normal range of motion. Neck supple. No JVD present. Cardiovascular: Normal rate , MERCEDES 3/6 and intact distal pulses. Pulmonary/Chest: Effort normal and breath sounds normal. No respiratory distress. No wheezes. No rales. Abdominal: Soft. Bowel sounds are normal. No distension. There is no tenderness. Musculoskeletal: Normal range of motion. No edema. Neurological: Alert and oriented to person, place, and time. No cranial nerve deficit. Coordination normal.   Skin: Skin is warm and dry. Psychiatric: Normal mood and affect.        Lab Results   Component Value Date/Time    CKTOTAL 83 01/17/2012 10:18 AM       Lab Results   Component Value Date/Time    WBC 10.8 01/17/2023 09:49 AM    RBC 4.59 01/17/2023 09:49 AM    RBC 4.80 01/17/2012 10:18 AM    HGB 14.3 01/17/2023 09:49 AM    HCT 44.5 01/17/2023 09:49 AM    MCV 97 01/17/2023 09:49 AM    MCH 31.2 01/17/2023 09:49 AM    MCHC 32.1 01/17/2023 09:49 AM    RDW 13.4 01/17/2023 09:49 AM     01/17/2023 09:49 AM    MPV 10.3 01/17/2023 09:49 AM       Lab Results   Component Value Date/Time     09/03/2014 08:06 AM    K 3.7 09/03/2014 08:06 AM     09/03/2014 08:06 AM    CO2 29 09/03/2014 08:06 AM    BUN 12 09/03/2014 08:06 AM    CREATININE 0.7 09/03/2014 08:06 AM    CALCIUM 9.5 09/03/2014 08:06 AM    LABGLOM 82 09/03/2014 08:06 AM    GLUCOSE 116 09/03/2014 08:06 AM       No results found for: ALKPHOS, ALT, AST, PROT, BILITOT, BILIDIR, IBILI, LABALBU    No results found for: MG    Lab Results   Component Value Date    INR 0.93 09/03/2014    INR 0.93 03/28/2014         No results found for: LABA1C    No results found for: TRIG, HDL, LDLCALC, LDLDIRECT, LABVLDL    No results found for: TSH      Testing Reviewed:      I haveindividually reviewed the below cardiac tests    EKG:    ECHO:   Results for orders placed during the hospital encounter of 06/05/20   Echocardiogram 2D/ M-Mode/ Colorflow/ Do    Narrative Transthoracic Echocardiography Report (TTE)     Demographics      Patient Name   [facility]    Gender               Female                  MICHELLE      MR #           [id number]        Race                                                       Ethnicity      Account #      [de-identified]        Room Number      Accession      [id number]        Date of Study        06/05/2020   Number      Date of Birth  1940       Referring Physician  Shayy Ellis      Age            [de-identified] year(s)       Sonographer          Bobbi Bolden RDCS                                      Interpreting         Echo reader of the                                   Physician            week                                                        Marisol Bowman MD     Procedure    Type of Study      TTE procedure:ECHOCARDIOGRAM COMPLETE 2D W DOPPLER W COLOR. Procedure Date  Date: 06/05/2020 Start: 02:06 PM    Study Location: Echo Lab  Technical Quality: Adequate visualization    Indications: Aortic stenosis. Additional Medical History:Hypertension, Smoker, Osteoarthritis,  Hyperlipidemia. Patient Status: Routine    Height: 68 inches Weight: 149 pounds BSA: 1.8 m^2 BMI: 22.66 kg/m^2    BP: 132/70 mmHg     Conclusions      Summary   Normal left ventricle size and systolic function. Ejection fraction was   estimated at 60 %. There were no regional left ventricular wall motion   abnormalities and wall thickness was within normal limits.    Doppler parameters were consistent with abnormal left ventricular   relaxation (grade 1 diastolic dysfunction). The left atrium is Moderately dilated. There is moderate aortic stenosis with valve area of 1.2 sq cm. The maximum aortic valve gradient is 30 mmHg, the mean gradient is 18   mmHg, and the peak velocity is 2.7 m/s. Signature      ----------------------------------------------------------------   Electronically signed by Sujata Ortega MD (Interpreting   physician) on 06/05/2020 at 06:25 PM   ----------------------------------------------------------------      Findings      Mitral Valve   The mitral valve structure was normal with normal leaflet separation. DOPPLER: The transmitral velocity was within the normal range with no   evidence for mitral stenosis. Mild mitral regurgitation is present. Aortic Valve   Aortic valve appears tricuspid. Aortic valve leaflets are Moderately   calcified. Leaflets exhibited mild to moderately increased thickness and   mildly reduced cuspal separation of the aortic valve. Mild aortic   regurgitation is noted. There is moderate aortic stenosis with valve area of 1.2 sq cm. The maximum aortic valve gradient is 30 mmHg, the mean gradient is 18   mmHg, and the peak velocity is 2.7 m/s. Tricuspid Valve   The tricuspid valve structure was normal with normal leaflet separation. DOPPLER: There was no evidence of tricuspid stenosis. Mild tricuspid regurgitation visualized. Pulmonic Valve   The pulmonic valve leaflets exhibited normal thickness, no calcification,   and normal cuspal separation. DOPPLER: The transpulmonic velocity was   within the normal range with no evidence for regurgitation. Left Atrium   The left atrium is Moderately dilated. Left Ventricle   Normal left ventricle size and systolic function. Ejection fraction was   estimated at 60 %. There were no regional left ventricular wall motion   abnormalities and wall thickness was within normal limits. Doppler parameters were consistent with abnormal left ventricular   relaxation (grade 1 diastolic dysfunction). Right Atrium   Right atrial size was normal.      Right Ventricle   The right ventricular size was normal with normal systolic function and   wall thickness. Pericardial Effusion   The pericardium was normal in appearance with no evidence of a pericardial   effusion. Pleural Effusion   No evidence of pleural effusion. Aorta / Great Vessels   -Aortic root dimension within normal limits.   -The Pulmonary artery is within normal limits. -IVC size is within normal limits with normal respiratory phasic changes.      M-Mode/2D Measurements & Calculations      LV Diastolic   LV Systolic Dimension:    AV Cusp Separation: 1.1 cmLA   Dimension: 3.4 2.4 cm                    Dimension: 3.1 cmAO Root   cm             LV Volume Diastolic: 45.8 Dimension: 3.4 cmLA Area: 14.4   LV FS:29.4 %   ml                        cm^2   LV PW          LV Volume Systolic: 79.6   Diastolic: 1.1 ml   cm             LV EDV/LV EDV Index: 47.4   Septum         ml/26 m^2LV ESV/LV ESV    RV Diastolic Dimension: 2.5 cm   Diastolic: 1.2 Index: 95.4 ml/11 m^2   cm             EF Calculated: 57.4 %     LA/Aorta: 0.91                                            Ascending Aorta: 3 cm                                            LA volume/Index: 32.8 ml /18m^2                     LVOT: 2 cm     Doppler Measurements & Calculations      MV Peak E-Wave: 70   AV Peak Velocity: 261    LVOT Peak Velocity: 99.2   cm/s                 cm/s                     cm/s   MV Peak A-Wave: 120  AV Peak Gradient: 27.25  LVOT Mean Velocity: 69.5   cm/s                 mmHg                     cm/s   MV E/A Ratio: 0.58   AV Mean Velocity: 190    LVOT Peak Gradient: 4   MV Peak Gradient:    cm/s                     mmHgLVOT Mean Gradient: 2   1.96 mmHg            AV Mean Gradient: 13     mmHg                        mmHg   MV Deceleration      AV VTI: 55.2 cm          TV Peak E-Wave: 45.4 cm/s   Time: 391 msec       AV Area                  TV Peak A-Wave: 41 cm/s                        (Continuity):1.16 cm^2                                                 TV Peak Gradient: 0.82 mmHg   MV E' Septal         LVOT VTI: 20.4 cm   Velocity: 3 cm/s     AV P1/2t: 441 msec       PV Peak Velocity: 66 cm/s   MV A' Septal         IVRT: 130 msec           PV Peak Gradient: 1.74 mmHg   Velocity: 7.9 cm/s   MV E' Lateral   Velocity: 4.5 cm/s   AV DVI (VTI): 0.37AV DVI   MV A' Lateral        (Vmax):0.38   Velocity: 11.3 cm/s   E/E' septal: 23.33   E/E' lateral: 15.56     [email or web address] [id number]       STRESS:    CATH:    Assessment/Plan       Diagnosis Orders   1. SOB (shortness of breath)  EKG 12 Lead          Aortic stenosis, moderate  HTN  HLD  OA    No significant cardiac symptoms  Reviewed recent RALPH findings  Will need routine survillence  Reviewed carotid duplex  Also has neck pains, concerning for cervical arthrtis vs nerve impingement  Discussed about getting XR of cervical spine and follow up with PCP  The patient is asked to make an attempt to improve diet and exercise patterns to aid in medical management of this problem. Advised more plant based nutrition/meditarrean diet   Advised patient to call office or seek immediate medical attention if there is any new onset of  any chest pain, sob, palpitations, lightheadedness, dizziness, orthopnea, PND or pedal edema. All medication side effects were discussed in details. Thank you for allowing me to participate in the care of this patient. Please do not hesitate to contact me for any further questions. Return in about 1 year (around 2/15/2024), or if symptoms worsen or fail to improve, for Regular follow up, Review testing.        Electronically signed by Jeremie Sahni MD [facility]  2/15/2023 at 9:32 AM EDT

## 2023-03-21 ENCOUNTER — APPOINTMENT (OUTPATIENT)
Dept: GENERAL RADIOLOGY | Age: 83
End: 2023-03-21
Payer: MEDICARE

## 2023-03-21 ENCOUNTER — HOSPITAL ENCOUNTER (EMERGENCY)
Age: 83
Discharge: HOME | End: 2023-03-21
Attending: EMERGENCY MEDICINE
Payer: MEDICARE

## 2023-03-21 VITALS
WEIGHT: 132 LBS | HEART RATE: 111 BPM | DIASTOLIC BLOOD PRESSURE: 99 MMHG | BODY MASS INDEX: 20 KG/M2 | TEMPERATURE: 98 F | SYSTOLIC BLOOD PRESSURE: 145 MMHG | OXYGEN SATURATION: 94 % | HEIGHT: 68 IN | RESPIRATION RATE: 24 BRPM

## 2023-03-21 DIAGNOSIS — J44.1 ACUTE EXACERBATION OF CHRONIC OBSTRUCTIVE PULMONARY DISEASE (COPD) (HCC): Primary | ICD-10-CM

## 2023-03-21 PROCEDURE — 6370000000 HC RX 637 (ALT 250 FOR IP): Performed by: EMERGENCY MEDICINE

## 2023-03-21 PROCEDURE — 6360000002 HC RX W HCPCS: Performed by: EMERGENCY MEDICINE

## 2023-03-21 PROCEDURE — 99284 EMERGENCY DEPT VISIT MOD MDM: CPT

## 2023-03-21 PROCEDURE — 96372 THER/PROPH/DIAG INJ SC/IM: CPT

## 2023-03-21 PROCEDURE — 93005 ELECTROCARDIOGRAM TRACING: CPT | Performed by: EMERGENCY MEDICINE

## 2023-03-21 PROCEDURE — 71046 X-RAY EXAM CHEST 2 VIEWS: CPT

## 2023-03-21 RX ORDER — PREDNISONE 20 MG/1
20 TABLET ORAL 2 TIMES DAILY
Qty: 14 TABLET | Refills: 0 | Status: SHIPPED | OUTPATIENT
Start: 2023-03-21 | End: 2023-03-28

## 2023-03-21 RX ORDER — ALBUTEROL SULFATE 2.5 MG/3ML
2.5 SOLUTION RESPIRATORY (INHALATION) ONCE
Status: COMPLETED | OUTPATIENT
Start: 2023-03-21 | End: 2023-03-21

## 2023-03-21 RX ORDER — AZITHROMYCIN 250 MG/1
500 TABLET, FILM COATED ORAL ONCE
Status: COMPLETED | OUTPATIENT
Start: 2023-03-21 | End: 2023-03-21

## 2023-03-21 RX ORDER — IPRATROPIUM BROMIDE AND ALBUTEROL SULFATE 2.5; .5 MG/3ML; MG/3ML
1 SOLUTION RESPIRATORY (INHALATION) ONCE
Status: COMPLETED | OUTPATIENT
Start: 2023-03-21 | End: 2023-03-21

## 2023-03-21 RX ORDER — AZITHROMYCIN 250 MG/1
250 TABLET, FILM COATED ORAL DAILY
Qty: 5 TABLET | Refills: 0 | Status: SHIPPED | OUTPATIENT
Start: 2023-03-21 | End: 2023-03-26

## 2023-03-21 RX ORDER — METHYLPREDNISOLONE SODIUM SUCCINATE 125 MG/2ML
80 INJECTION, POWDER, LYOPHILIZED, FOR SOLUTION INTRAMUSCULAR; INTRAVENOUS ONCE
Status: COMPLETED | OUTPATIENT
Start: 2023-03-21 | End: 2023-03-21

## 2023-03-21 RX ORDER — ALBUTEROL SULFATE 90 UG/1
2 AEROSOL, METERED RESPIRATORY (INHALATION) ONCE
Status: COMPLETED | OUTPATIENT
Start: 2023-03-21 | End: 2023-03-21

## 2023-03-21 RX ADMIN — IPRATROPIUM BROMIDE AND ALBUTEROL SULFATE 1 AMPULE: .5; 3 SOLUTION RESPIRATORY (INHALATION) at 16:55

## 2023-03-21 RX ADMIN — AZITHROMYCIN MONOHYDRATE 500 MG: 250 TABLET ORAL at 17:17

## 2023-03-21 RX ADMIN — ALBUTEROL SULFATE 2.5 MG: 2.5 SOLUTION RESPIRATORY (INHALATION) at 16:55

## 2023-03-21 RX ADMIN — ALBUTEROL SULFATE 2 PUFF: 90 AEROSOL, METERED RESPIRATORY (INHALATION) at 17:19

## 2023-03-21 RX ADMIN — METHYLPREDNISOLONE SODIUM SUCCINATE 80 MG: 125 INJECTION, POWDER, FOR SOLUTION INTRAMUSCULAR; INTRAVENOUS at 16:55

## 2023-03-21 ASSESSMENT — PAIN - FUNCTIONAL ASSESSMENT
PAIN_FUNCTIONAL_ASSESSMENT: NONE - DENIES PAIN
PAIN_FUNCTIONAL_ASSESSMENT: NONE - DENIES PAIN

## 2023-03-21 NOTE — ED NOTES
Pt. Resting quietly, resp. Even and nonlabored. Denies any needs at this time, occasional cough noted.       Aaron Levin RN  03/21/23 [id number]

## 2023-03-21 NOTE — ED NOTES
Resp. Even and nonlabored, lung sounds remain diminished. AVS rev'd with pt. And copy given. Pulse regular. Extremities warm. Respirations regular and quiet. Mucous membranes pink & moist. Alert and oriented times 3. No nausea or vomiting. Range of motion within patient's limits. Skin pale, warm and dry. Calm and cooperative. Denies pain.      Marry Shrestha RN  03/21/23 1453

## 2023-03-21 NOTE — ED NOTES
Dr. Quyen Calhoun @ bedside to discuss poc and test results with pt.       Giana Hendrickson RN  03/21/23 [id number]

## 2023-03-21 NOTE — ED PROVIDER NOTES
signs are interpreted as mild tachycardia. I ordered chest x-ray and reviewed pictures and interpreted as COPD without infiltration. I ordered 1 albuterol and 1 DuoNeb treatment, I also ordered Solu-Medrol IM and Zithromax p.o. Reevaluation at 5:35 PM:  She was awake alert, breathing comfortably without respiratory distress. O2 sats was 97% on room air. She is stable for discharge. I ordered albuterol inhaler to go. I discussed diagnosis and treatment plan with her. I counseled her about the importance of smoking cessation. FINAL IMPRESSION      1. Acute exacerbation of chronic obstructive pulmonary disease (COPD) ([facility] [address]          DISPOSITION/PLAN   Discharged home in good condition.     PATIENT REFERRED TO:  Camille Denton, JONE - CNP  [place] [address]  [phone]    In 2 days      DISCHARGE MEDICATIONS:  Discharge Medication List as of 3/21/2023  5:36 PM        START taking these medications    Details   azithromycin (ZITHROMAX) 250 MG tablet Take 1 tablet by mouth daily for 5 days, Disp-5 tablet, R-0Normal      predniSONE (DELTASONE) 20 MG tablet Take 1 tablet by mouth 2 times daily for 7 days, Disp-14 tablet, R-0Normal             (Please note that portions of this note were completed with a voice recognition program.  Efforts were made to edit the dictations but occasionally words are mis-transcribed.)    MD Laura Barnard MD  03/21/23 Maile Baldwin MD  03/21/23 8611

## 2023-03-21 NOTE — ED NOTES
Pt. Presents ambulatory to ED with c/o cough and shortness of breath for 2 days. Pt. Voices was seen @ [facility] walk-in clinic in [place] had negative covid and influenza there. Pt. Denies CP, dizziiness, palpitations, or diaphoresis. Pt. Taken to exam 7 and placed on continous cardiac monitor, 12 lead EKG done.      Poly Ferrer RN  03/21/23 1600

## 2023-03-22 LAB
EKG ATRIAL RATE: 109 BPM
EKG P AXIS: 73 DEGREES
EKG P-R INTERVAL: 168 MS
EKG Q-T INTERVAL: 338 MS
EKG QRS DURATION: 80 MS
EKG QTC CALCULATION (BAZETT): 455 MS
EKG R AXIS: -51 DEGREES
EKG T AXIS: 67 DEGREES
EKG VENTRICULAR RATE: 109 BPM

## 2023-03-22 PROCEDURE — 93010 ELECTROCARDIOGRAM REPORT: CPT | Performed by: INTERNAL MEDICINE

## 2023-04-17 ENCOUNTER — OFFICE VISIT (OUTPATIENT)
Dept: ONCOLOGY | Age: 83
End: 2023-04-17
Payer: MEDICARE

## 2023-04-17 ENCOUNTER — HOSPITAL ENCOUNTER (OUTPATIENT)
Dept: INFUSION THERAPY | Age: 83
Discharge: HOME | End: 2023-04-17
Payer: MEDICARE

## 2023-04-17 VITALS
OXYGEN SATURATION: 95 % | SYSTOLIC BLOOD PRESSURE: 137 MMHG | TEMPERATURE: 98 F | HEART RATE: 65 BPM | DIASTOLIC BLOOD PRESSURE: 64 MMHG

## 2023-04-17 VITALS
HEART RATE: 65 BPM | DIASTOLIC BLOOD PRESSURE: 64 MMHG | WEIGHT: 134 LBS | TEMPERATURE: 98 F | BODY MASS INDEX: 20.37 KG/M2 | OXYGEN SATURATION: 95 % | SYSTOLIC BLOOD PRESSURE: 137 MMHG

## 2023-04-17 DIAGNOSIS — D72.829 LEUKOCYTOSIS, UNSPECIFIED TYPE: Primary | ICD-10-CM

## 2023-04-17 DIAGNOSIS — D72.829 LEUKOCYTOSIS, UNSPECIFIED TYPE: ICD-10-CM

## 2023-04-17 LAB
ABSOLUTE IMMATURE GRANULOCYTE: 0.02 THOU/MM3 (ref 0–0.07)
BASOPHILS ABSOLUTE: 0 THOU/MM3 (ref 0–0.1)
BASOPHILS NFR BLD AUTO: 1 % (ref 0–3)
EOSINOPHIL NFR BLD AUTO: 3 % (ref 0–4)
EOSINOPHILS ABSOLUTE: 0.2 THOU/MM3 (ref 0–0.4)
ERYTHROCYTE [DISTWIDTH] IN BLOOD BY AUTOMATED COUNT: 13.4 % (ref 11.5–14.5)
HCT VFR BLD AUTO: 43.8 % (ref 37–47)
HGB BLD-MCNC: 14 GM/DL (ref 12–16)
IMMATURE GRANULOCYTES: 0 %
LYMPHOCYTES ABSOLUTE: 1.6 THOU/MM3 (ref 1–4.8)
LYMPHOCYTES NFR BLD AUTO: 21 % (ref 15–47)
MCH RBC QN AUTO: 30.7 PG (ref 26–33)
MCHC RBC AUTO-ENTMCNC: 32 GM/DL (ref 32.2–35.5)
MCV RBC AUTO: 96 FL (ref 81–99)
MONOCYTES ABSOLUTE: 0.6 THOU/MM3 (ref 0.4–1.3)
MONOCYTES NFR BLD AUTO: 8 % (ref 0–12)
NEUTROPHILS NFR BLD AUTO: 68 % (ref 43–75)
PLATELET # BLD AUTO: 302 THOU/MM3 (ref 130–400)
PMV BLD AUTO: 9.6 FL (ref 9.4–12.4)
RBC # BLD AUTO: 4.56 MILL/MM3 (ref 4.2–5.4)
SEGMENTED NEUTROPHILS ABSOLUTE COUNT: 5.2 THOU/MM3 (ref 1.8–7.7)
WBC # BLD AUTO: 7.7 THOU/MM3 (ref 4.8–10.8)

## 2023-04-17 PROCEDURE — 1123F ACP DISCUSS/DSCN MKR DOCD: CPT | Performed by: PHYSICIAN ASSISTANT

## 2023-04-17 PROCEDURE — 99213 OFFICE O/P EST LOW 20 MIN: CPT | Performed by: PHYSICIAN ASSISTANT

## 2023-04-17 PROCEDURE — 4004F PT TOBACCO SCREEN RCVD TLK: CPT | Performed by: PHYSICIAN ASSISTANT

## 2023-04-17 PROCEDURE — G8399 PT W/DXA RESULTS DOCUMENT: HCPCS | Performed by: PHYSICIAN ASSISTANT

## 2023-04-17 PROCEDURE — 3074F SYST BP LT 130 MM HG: CPT | Performed by: PHYSICIAN ASSISTANT

## 2023-04-17 PROCEDURE — 3078F DIAST BP <80 MM HG: CPT | Performed by: PHYSICIAN ASSISTANT

## 2023-04-17 PROCEDURE — G8427 DOCREV CUR MEDS BY ELIG CLIN: HCPCS | Performed by: PHYSICIAN ASSISTANT

## 2023-04-17 PROCEDURE — G8420 CALC BMI NORM PARAMETERS: HCPCS | Performed by: PHYSICIAN ASSISTANT

## 2023-04-17 PROCEDURE — 1090F PRES/ABSN URINE INCON ASSESS: CPT | Performed by: PHYSICIAN ASSISTANT

## 2023-04-17 PROCEDURE — 99211 OFF/OP EST MAY X REQ PHY/QHP: CPT

## 2023-04-17 PROCEDURE — 36415 COLL VENOUS BLD VENIPUNCTURE: CPT

## 2023-04-17 PROCEDURE — 85025 COMPLETE CBC W/AUTO DIFF WBC: CPT

## 2023-04-17 NOTE — PROGRESS NOTES
[facility] [address] [facility] [address] [place] [address],[address]  [address] [address]  Dept: [phone]  Dept Fax: [phone]: [phone]      Visit Date:4/17/2023     Jimenez Dupont is a 80 y.o. female who presents today for:   Chief Complaint   Patient presents with    Follow-up     Leukocytosis, unspecified type        HPI:   Jimenez Dupont is a 80 y.o. 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 She was seen as a new patient on 12/20/2022. The patient had CBC completed on 11/15/2022 which showed white blood cell count 14.2. She was referred for further evaluation. CBC also showed hemoglobin 15.5, hematocrit 46.9, MCV 94.5. Platelet count 627,408. The patient denies prior history of leukocytosis. She denies recent or recurrent infection. She denies history of COVID-19. She did receive COVID-19 booster on 10/13/2022. She denies development of B type symptoms; no unintentional weight loss, poor appetite, early satiety, lymphadenopathy or abdominal bloating. She denies history of autoimmune disorder or splenectomy. She denies recent steroid use. She is a current smoker of 1 to 2 cigarettes/day. Denies alcohol use. Past medical history includes hypertension, osteoarthritis. Interval History 4/17/2023:   Patient is here for follow-up evaluation of leukocytosis. The patient is here with her daughter today. She offers no new complaints. She denies unintentional weight loss, poor appetite, early satiety, persistent fever, recurrent infections, night sweats or abdominal bloating. She continues to follow with cardiology for aortic stenosis. She went to the ED on 3/21/2023 for cough, shortness of breath. She was prescribed prednisone and Z-Wayne. She states symptoms have resolved. PMH, SH, and FH:  I reviewed the patients medication list and allergy list as noted on the electronic medical record.  The PMH, SH and FH

## 2023-08-08 ENCOUNTER — HOSPITAL ENCOUNTER (OUTPATIENT)
Age: 83
Discharge: HOME | End: 2023-08-08
Payer: MEDICARE

## 2023-08-08 ENCOUNTER — HOSPITAL ENCOUNTER (OUTPATIENT)
Dept: GENERAL RADIOLOGY | Age: 83
Discharge: HOME | End: 2023-08-08
Payer: MEDICARE

## 2023-08-08 DIAGNOSIS — M25.572 LEFT ANKLE PAIN, UNSPECIFIED CHRONICITY: ICD-10-CM

## 2023-08-08 DIAGNOSIS — M25.571 RIGHT ANKLE PAIN, UNSPECIFIED CHRONICITY: ICD-10-CM

## 2023-08-08 PROCEDURE — 73610 X-RAY EXAM OF ANKLE: CPT

## 2023-08-18 ENCOUNTER — HOSPITAL ENCOUNTER (OUTPATIENT)
Dept: INFUSION THERAPY | Age: 83
Discharge: HOME | End: 2023-08-18
Payer: MEDICARE

## 2023-08-18 ENCOUNTER — OFFICE VISIT (OUTPATIENT)
Dept: ONCOLOGY | Age: 83
End: 2023-08-18
Payer: MEDICARE

## 2023-08-18 VITALS
OXYGEN SATURATION: 95 % | RESPIRATION RATE: 18 BRPM | SYSTOLIC BLOOD PRESSURE: 118 MMHG | HEART RATE: 65 BPM | BODY MASS INDEX: 20.46 KG/M2 | TEMPERATURE: 98.2 F | WEIGHT: 135 LBS | DIASTOLIC BLOOD PRESSURE: 72 MMHG | HEIGHT: 68 IN

## 2023-08-18 VITALS
HEART RATE: 65 BPM | RESPIRATION RATE: 18 BRPM | TEMPERATURE: 98.2 F | SYSTOLIC BLOOD PRESSURE: 118 MMHG | DIASTOLIC BLOOD PRESSURE: 72 MMHG

## 2023-08-18 DIAGNOSIS — Z86.2 HISTORY OF LEUKOCYTOSIS: Primary | ICD-10-CM

## 2023-08-18 DIAGNOSIS — D72.829 LEUKOCYTOSIS, UNSPECIFIED TYPE: ICD-10-CM

## 2023-08-18 LAB
ABSOLUTE IMMATURE GRANULOCYTE: 0.22 THOU/MM3 (ref 0–0.07)
BASOPHILS ABSOLUTE: 0.1 THOU/MM3 (ref 0–0.1)
BASOPHILS NFR BLD AUTO: 1 % (ref 0–3)
EOSINOPHIL NFR BLD AUTO: 3 % (ref 0–4)
EOSINOPHILS ABSOLUTE: 0.3 THOU/MM3 (ref 0–0.4)
ERYTHROCYTE [DISTWIDTH] IN BLOOD BY AUTOMATED COUNT: 13.1 % (ref 11.5–14.5)
HCT VFR BLD AUTO: 42.9 % (ref 37–47)
HGB BLD-MCNC: 14 GM/DL (ref 12–16)
IMMATURE GRANULOCYTES: 2 %
LYMPHOCYTES ABSOLUTE: 2.3 THOU/MM3 (ref 1–4.8)
LYMPHOCYTES NFR BLD AUTO: 23 % (ref 15–47)
MCH RBC QN AUTO: 31.3 PG (ref 26–33)
MCHC RBC AUTO-ENTMCNC: 32.6 GM/DL (ref 32.2–35.5)
MCV RBC AUTO: 96 FL (ref 81–99)
MONOCYTES ABSOLUTE: 0.8 THOU/MM3 (ref 0.4–1.3)
MONOCYTES NFR BLD AUTO: 8 % (ref 0–12)
NEUTROPHILS NFR BLD AUTO: 63 % (ref 43–75)
PLATELET # BLD AUTO: 327 THOU/MM3 (ref 130–400)
PMV BLD AUTO: 9.3 FL (ref 9.4–12.4)
RBC # BLD AUTO: 4.48 MILL/MM3 (ref 4.2–5.4)
SEGMENTED NEUTROPHILS ABSOLUTE COUNT: 6.3 THOU/MM3 (ref 1.8–7.7)
WBC # BLD AUTO: 10 THOU/MM3 (ref 4.8–10.8)

## 2023-08-18 PROCEDURE — 1090F PRES/ABSN URINE INCON ASSESS: CPT | Performed by: PHYSICIAN ASSISTANT

## 2023-08-18 PROCEDURE — 85025 COMPLETE CBC W/AUTO DIFF WBC: CPT

## 2023-08-18 PROCEDURE — 99211 OFF/OP EST MAY X REQ PHY/QHP: CPT

## 2023-08-18 PROCEDURE — G8399 PT W/DXA RESULTS DOCUMENT: HCPCS | Performed by: PHYSICIAN ASSISTANT

## 2023-08-18 PROCEDURE — 99213 OFFICE O/P EST LOW 20 MIN: CPT | Performed by: PHYSICIAN ASSISTANT

## 2023-08-18 PROCEDURE — 36415 COLL VENOUS BLD VENIPUNCTURE: CPT

## 2023-08-18 PROCEDURE — 4004F PT TOBACCO SCREEN RCVD TLK: CPT | Performed by: PHYSICIAN ASSISTANT

## 2023-08-18 PROCEDURE — G8427 DOCREV CUR MEDS BY ELIG CLIN: HCPCS | Performed by: PHYSICIAN ASSISTANT

## 2023-08-18 PROCEDURE — 3078F DIAST BP <80 MM HG: CPT | Performed by: PHYSICIAN ASSISTANT

## 2023-08-18 PROCEDURE — 3074F SYST BP LT 130 MM HG: CPT | Performed by: PHYSICIAN ASSISTANT

## 2023-08-18 PROCEDURE — G8420 CALC BMI NORM PARAMETERS: HCPCS | Performed by: PHYSICIAN ASSISTANT

## 2023-08-18 PROCEDURE — 1123F ACP DISCUSS/DSCN MKR DOCD: CPT | Performed by: PHYSICIAN ASSISTANT

## 2023-08-18 RX ORDER — SULFAMETHOXAZOLE AND TRIMETHOPRIM 800; 160 MG/1; MG/1
TABLET ORAL
COMMUNITY
Start: 2023-08-10

## 2023-08-18 RX ORDER — CIPROFLOXACIN 500 MG/1
TABLET, FILM COATED ORAL
COMMUNITY
Start: 2023-08-14

## 2023-11-27 ENCOUNTER — HOSPITAL ENCOUNTER (OUTPATIENT)
Dept: CT IMAGING | Age: 83
Discharge: HOME | End: 2023-11-27
Payer: MEDICARE

## 2023-11-27 DIAGNOSIS — R91.8 PULMONARY NODULES: ICD-10-CM

## 2023-11-27 LAB
CREAT BLD-MCNC: 0.8 MG/DL (ref 0.5–1.2)
GFR SERPL CREATININE-BSD FRML MDRD: > 60 ML/MIN/1.73M2

## 2023-11-27 PROCEDURE — 71260 CT THORAX DX C+: CPT

## 2023-11-27 PROCEDURE — 82565 ASSAY OF CREATININE: CPT

## 2023-11-27 PROCEDURE — 6360000004 HC RX CONTRAST MEDICATION: Performed by: NURSE PRACTITIONER

## 2023-11-27 RX ADMIN — IOPAMIDOL 100 ML: 755 INJECTION, SOLUTION INTRAVENOUS at 11:43

## 2023-12-26 ENCOUNTER — OFFICE VISIT (OUTPATIENT)
Dept: ONCOLOGY | Age: 83
End: 2023-12-26
Payer: MEDICARE

## 2023-12-26 ENCOUNTER — HOSPITAL ENCOUNTER (OUTPATIENT)
Dept: INFUSION THERAPY | Age: 83
Discharge: HOME | End: 2023-12-26
Payer: MEDICARE

## 2023-12-26 VITALS
OXYGEN SATURATION: 98 % | SYSTOLIC BLOOD PRESSURE: 131 MMHG | DIASTOLIC BLOOD PRESSURE: 60 MMHG | TEMPERATURE: 97.9 F | RESPIRATION RATE: 18 BRPM | HEART RATE: 94 BPM

## 2023-12-26 VITALS
BODY MASS INDEX: 20.46 KG/M2 | HEIGHT: 68 IN | RESPIRATION RATE: 18 BRPM | WEIGHT: 135 LBS | SYSTOLIC BLOOD PRESSURE: 131 MMHG | TEMPERATURE: 97.9 F | OXYGEN SATURATION: 98 % | DIASTOLIC BLOOD PRESSURE: 60 MMHG | HEART RATE: 94 BPM

## 2023-12-26 DIAGNOSIS — D72.829 LEUKOCYTOSIS, UNSPECIFIED TYPE: ICD-10-CM

## 2023-12-26 DIAGNOSIS — Z86.2 HISTORY OF LEUKOCYTOSIS: Primary | ICD-10-CM

## 2023-12-26 LAB
ABSOLUTE IMMATURE GRANULOCYTE: 0.01 THOU/MM3 (ref 0–0.07)
BASOPHILS ABSOLUTE: 0 THOU/MM3 (ref 0–0.1)
BASOPHILS NFR BLD AUTO: 1 % (ref 0–3)
EOSINOPHIL NFR BLD AUTO: 2 % (ref 0–4)
EOSINOPHILS ABSOLUTE: 0.2 THOU/MM3 (ref 0–0.4)
ERYTHROCYTE [DISTWIDTH] IN BLOOD BY AUTOMATED COUNT: 13.8 % (ref 11.5–14.5)
HCT VFR BLD AUTO: 42.6 % (ref 37–47)
HGB BLD-MCNC: 13.9 GM/DL (ref 12–16)
IMMATURE GRANULOCYTES: 0 %
LYMPHOCYTES ABSOLUTE: 1.8 THOU/MM3 (ref 1–4.8)
LYMPHOCYTES NFR BLD AUTO: 22 % (ref 15–47)
MCH RBC QN AUTO: 32 PG (ref 26–33)
MCHC RBC AUTO-ENTMCNC: 32.6 GM/DL (ref 32.2–35.5)
MCV RBC AUTO: 98 FL (ref 81–99)
MONOCYTES ABSOLUTE: 0.7 THOU/MM3 (ref 0.4–1.3)
MONOCYTES NFR BLD AUTO: 9 % (ref 0–12)
NEUTROPHILS NFR BLD AUTO: 66 % (ref 43–75)
PLATELET # BLD AUTO: 260 THOU/MM3 (ref 130–400)
PMV BLD AUTO: 9.8 FL (ref 9.4–12.4)
RBC # BLD AUTO: 4.35 MILL/MM3 (ref 4.2–5.4)
SEGMENTED NEUTROPHILS ABSOLUTE COUNT: 5.3 THOU/MM3 (ref 1.8–7.7)
WBC # BLD AUTO: 8 THOU/MM3 (ref 4.8–10.8)

## 2023-12-26 PROCEDURE — 4004F PT TOBACCO SCREEN RCVD TLK: CPT | Performed by: PHYSICIAN ASSISTANT

## 2023-12-26 PROCEDURE — 85025 COMPLETE CBC W/AUTO DIFF WBC: CPT

## 2023-12-26 PROCEDURE — G8427 DOCREV CUR MEDS BY ELIG CLIN: HCPCS | Performed by: PHYSICIAN ASSISTANT

## 2023-12-26 PROCEDURE — 3075F SYST BP GE 130 - 139MM HG: CPT | Performed by: PHYSICIAN ASSISTANT

## 2023-12-26 PROCEDURE — 99213 OFFICE O/P EST LOW 20 MIN: CPT | Performed by: PHYSICIAN ASSISTANT

## 2023-12-26 PROCEDURE — G8484 FLU IMMUNIZE NO ADMIN: HCPCS | Performed by: PHYSICIAN ASSISTANT

## 2023-12-26 PROCEDURE — 1123F ACP DISCUSS/DSCN MKR DOCD: CPT | Performed by: PHYSICIAN ASSISTANT

## 2023-12-26 PROCEDURE — G8420 CALC BMI NORM PARAMETERS: HCPCS | Performed by: PHYSICIAN ASSISTANT

## 2023-12-26 PROCEDURE — G8399 PT W/DXA RESULTS DOCUMENT: HCPCS | Performed by: PHYSICIAN ASSISTANT

## 2023-12-26 PROCEDURE — 36415 COLL VENOUS BLD VENIPUNCTURE: CPT

## 2023-12-26 PROCEDURE — 99211 OFF/OP EST MAY X REQ PHY/QHP: CPT

## 2023-12-26 PROCEDURE — 3078F DIAST BP <80 MM HG: CPT | Performed by: PHYSICIAN ASSISTANT

## 2023-12-26 PROCEDURE — 1090F PRES/ABSN URINE INCON ASSESS: CPT | Performed by: PHYSICIAN ASSISTANT

## 2023-12-26 RX ORDER — THIAMINE HCL 100 MG
TABLET ORAL
COMMUNITY
Start: 2018-09-27

## 2023-12-26 RX ORDER — UMECLIDINIUM BROMIDE AND VILANTEROL TRIFENATATE 62.5; 25 UG/1; UG/1
1 POWDER RESPIRATORY (INHALATION)
COMMUNITY
Start: 2023-03-23

## 2023-12-26 RX ORDER — PSEUDOEPHEDRINE HCL 30 MG
TABLET ORAL
COMMUNITY
Start: 2023-09-19

## 2023-12-26 RX ORDER — ASCORBATE CALCIUM 500 MG
TABLET ORAL
COMMUNITY
Start: 2023-09-13

## 2023-12-26 NOTE — PROGRESS NOTES
months.       All patient questions answered. Pt voiced understanding. Patient agreed with treatment plan. Follow up as directed. Patient instructed to call for questions or concerns.      Electronically signed by   Linda Yang PA-C

## 2024-02-02 ENCOUNTER — HOSPITAL ENCOUNTER (OUTPATIENT)
Dept: GENERAL RADIOLOGY | Age: 84
Discharge: HOME | End: 2024-02-02
Payer: MEDICARE

## 2024-02-02 ENCOUNTER — HOSPITAL ENCOUNTER (OUTPATIENT)
Age: 84
Discharge: HOME | End: 2024-02-02
Payer: MEDICARE

## 2024-02-02 DIAGNOSIS — M25.552 LEFT HIP PAIN: ICD-10-CM

## 2024-02-02 DIAGNOSIS — M54.50 DORSALGIA OF LUMBAR REGION: ICD-10-CM

## 2024-02-02 PROCEDURE — 73502 X-RAY EXAM HIP UNI 2-3 VIEWS: CPT

## 2024-02-02 PROCEDURE — 72110 X-RAY EXAM L-2 SPINE 4/>VWS: CPT

## 2024-02-27 NOTE — PATIENT INSTRUCTIONS
Your  Nurses  Today:  Emma    Your Provider for Today: Dr. Ovalles       You may receive a survey regarding the care you received during your visit.  Your input is valuable to us.  We encourage you to complete and return your survey.  We hope you will choose us in the future for your healthcare needs.

## 2024-02-28 ENCOUNTER — OFFICE VISIT (OUTPATIENT)
Dept: CARDIOLOGY CLINIC | Age: 84
End: 2024-02-28
Payer: MEDICARE

## 2024-02-28 VITALS
HEIGHT: 67 IN | WEIGHT: 135 LBS | HEART RATE: 68 BPM | DIASTOLIC BLOOD PRESSURE: 70 MMHG | BODY MASS INDEX: 21.19 KG/M2 | SYSTOLIC BLOOD PRESSURE: 122 MMHG

## 2024-02-28 DIAGNOSIS — I10 PRIMARY HYPERTENSION: Primary | ICD-10-CM

## 2024-02-28 PROCEDURE — 3078F DIAST BP <80 MM HG: CPT | Performed by: INTERNAL MEDICINE

## 2024-02-28 PROCEDURE — G8420 CALC BMI NORM PARAMETERS: HCPCS | Performed by: INTERNAL MEDICINE

## 2024-02-28 PROCEDURE — G8427 DOCREV CUR MEDS BY ELIG CLIN: HCPCS | Performed by: INTERNAL MEDICINE

## 2024-02-28 PROCEDURE — 99214 OFFICE O/P EST MOD 30 MIN: CPT | Performed by: INTERNAL MEDICINE

## 2024-02-28 PROCEDURE — G8399 PT W/DXA RESULTS DOCUMENT: HCPCS | Performed by: INTERNAL MEDICINE

## 2024-02-28 PROCEDURE — 1123F ACP DISCUSS/DSCN MKR DOCD: CPT | Performed by: INTERNAL MEDICINE

## 2024-02-28 PROCEDURE — 3074F SYST BP LT 130 MM HG: CPT | Performed by: INTERNAL MEDICINE

## 2024-02-28 PROCEDURE — 1090F PRES/ABSN URINE INCON ASSESS: CPT | Performed by: INTERNAL MEDICINE

## 2024-02-28 PROCEDURE — 93000 ELECTROCARDIOGRAM COMPLETE: CPT | Performed by: INTERNAL MEDICINE

## 2024-02-28 PROCEDURE — G8484 FLU IMMUNIZE NO ADMIN: HCPCS | Performed by: INTERNAL MEDICINE

## 2024-02-28 PROCEDURE — 4004F PT TOBACCO SCREEN RCVD TLK: CPT | Performed by: INTERNAL MEDICINE

## 2024-02-28 NOTE — PROGRESS NOTES
Pt here for 1 year check up     Denies chest pain or SOB     EKG DONE  
normal limits.   Doppler parameters were consistent with abnormal left ventricular   relaxation (grade 1 diastolic dysfunction).      Right Atrium   Right atrial size was normal.      Right Ventricle   The right ventricular size was normal with normal systolic function and   wall thickness.      Pericardial Effusion   The pericardium was normal in appearance with no evidence of a pericardial   effusion.      Pleural Effusion   No evidence of pleural effusion.      Aorta / Great Vessels   -Aortic root dimension within normal limits.   -The Pulmonary artery is within normal limits.   -IVC size is within normal limits with normal respiratory phasic changes.     M-Mode/2D Measurements & Calculations      LV Diastolic   LV Systolic Dimension:    AV Cusp Separation: 1.1 cmLA   Dimension: 3.4 2.4 cm                    Dimension: 3.1 cmAO Root   cm             LV Volume Diastolic: 47.4 Dimension: 3.4 cmLA Area: 14.4   LV FS:29.4 %   ml                        cm^2   LV PW          LV Volume Systolic: 20.2   Diastolic: 1.1 ml   cm             LV EDV/LV EDV Index: 47.4   Septum         ml/26 m^2LV ESV/LV ESV    RV Diastolic Dimension: 2.5 cm   Diastolic: 1.2 Index: 20.2 ml/11 m^2   cm             EF Calculated: 57.4 %     LA/Aorta: 0.91                                            Ascending Aorta: 3 cm                                            LA volume/Index: 32.8 ml /18m^2                     LVOT: 2 cm     Doppler Measurements & Calculations      MV Peak E-Wave: 70   AV Peak Velocity: 261    LVOT Peak Velocity: 99.2   cm/s                 cm/s                     cm/s   MV Peak A-Wave: 120  AV Peak Gradient: 27.25  LVOT Mean Velocity: 69.5   cm/s                 mmHg                     cm/s   MV E/A Ratio: 0.58   AV Mean Velocity: 190    LVOT Peak Gradient: 4   MV Peak Gradient:    cm/s                     mmHgLVOT Mean Gradient: 2   1.96 mmHg            AV Mean Gradient: 13     mmHg                        mmHg   MV

## 2024-03-11 ENCOUNTER — HOSPITAL ENCOUNTER (OUTPATIENT)
Dept: MRI IMAGING | Age: 84
Discharge: HOME | End: 2024-03-11
Payer: MEDICARE

## 2024-03-11 DIAGNOSIS — M54.41 ACUTE BILATERAL LOW BACK PAIN WITH BILATERAL SCIATICA: ICD-10-CM

## 2024-03-11 DIAGNOSIS — M54.42 ACUTE BILATERAL LOW BACK PAIN WITH BILATERAL SCIATICA: ICD-10-CM

## 2024-03-11 PROCEDURE — 72148 MRI LUMBAR SPINE W/O DYE: CPT

## 2024-07-08 ENCOUNTER — HOSPITAL ENCOUNTER (OUTPATIENT)
Dept: INFUSION THERAPY | Age: 84
Discharge: HOME | End: 2024-07-08
Payer: MEDICARE

## 2024-07-08 ENCOUNTER — OFFICE VISIT (OUTPATIENT)
Dept: ONCOLOGY | Age: 84
End: 2024-07-08

## 2024-07-08 VITALS
DIASTOLIC BLOOD PRESSURE: 79 MMHG | WEIGHT: 135.8 LBS | HEIGHT: 67 IN | HEART RATE: 76 BPM | SYSTOLIC BLOOD PRESSURE: 177 MMHG | TEMPERATURE: 97.5 F | OXYGEN SATURATION: 96 % | BODY MASS INDEX: 21.31 KG/M2 | RESPIRATION RATE: 18 BRPM

## 2024-07-08 VITALS
RESPIRATION RATE: 18 BRPM | HEART RATE: 76 BPM | DIASTOLIC BLOOD PRESSURE: 79 MMHG | TEMPERATURE: 97.5 F | SYSTOLIC BLOOD PRESSURE: 177 MMHG | OXYGEN SATURATION: 96 %

## 2024-07-08 DIAGNOSIS — Z86.2 HISTORY OF LEUKOCYTOSIS: ICD-10-CM

## 2024-07-08 DIAGNOSIS — Z86.2 HISTORY OF LEUKOCYTOSIS: Primary | ICD-10-CM

## 2024-07-08 LAB
BASOPHILS ABSOLUTE: 0 THOU/MM3 (ref 0–0.1)
BASOPHILS NFR BLD AUTO: 0 % (ref 0–3)
EOSINOPHIL NFR BLD AUTO: 1 % (ref 0–4)
EOSINOPHILS ABSOLUTE: 0.1 THOU/MM3 (ref 0–0.4)
ERYTHROCYTE [DISTWIDTH] IN BLOOD BY AUTOMATED COUNT: 12.9 % (ref 11.5–14.5)
HCT VFR BLD AUTO: 44.2 % (ref 37–47)
HGB BLD-MCNC: 14.4 GM/DL (ref 12–16)
IMMATURE GRANULOCYTES %: 0 %
IMMATURE GRANULOCYTES ABSOLUTE: 0 THOU/MM3 (ref 0–0.07)
LYMPHOCYTES ABSOLUTE: 1.9 THOU/MM3 (ref 1–4.8)
LYMPHOCYTES NFR BLD AUTO: 19 % (ref 15–47)
MCH RBC QN AUTO: 33 PG (ref 26–33)
MCHC RBC AUTO-ENTMCNC: 32.6 GM/DL (ref 32.2–35.5)
MCV RBC AUTO: 101 FL (ref 81–99)
MONOCYTES ABSOLUTE: 0.8 THOU/MM3 (ref 0.4–1.3)
MONOCYTES NFR BLD AUTO: 8 % (ref 0–12)
NEUTROPHILS ABSOLUTE: 6.9 THOU/MM3 (ref 1.8–7.7)
NEUTROPHILS NFR BLD AUTO: 71 % (ref 43–75)
PLATELET # BLD AUTO: 260 THOU/MM3 (ref 130–400)
PMV BLD AUTO: 9.7 FL (ref 9.4–12.4)
RBC # BLD AUTO: 4.36 MILL/MM3 (ref 4.2–5.4)
WBC # BLD AUTO: 9.7 THOU/MM3 (ref 4.8–10.8)

## 2024-07-08 PROCEDURE — 99211 OFF/OP EST MAY X REQ PHY/QHP: CPT

## 2024-07-08 PROCEDURE — 36415 COLL VENOUS BLD VENIPUNCTURE: CPT

## 2024-07-08 PROCEDURE — 85025 COMPLETE CBC W/AUTO DIFF WBC: CPT

## 2024-07-08 NOTE — PATIENT INSTRUCTIONS
Return to clinic in 6 months  Labs on RTC: CBC, ferritin   Please call for questions or concerns.

## 2024-07-08 NOTE — PROGRESS NOTES
[facility] [address], [address]  [facility] [address]  Dept: [phone]  Dept Fax: [phone] Loc: [phone]      Visit Date:7/8/2024     Jayde Vicente is a 84 y.o. female who presents today for:   Follow up       HPI:   Jayde Vicente is a 84 y.o. 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 She was seen as a new patient on 12/20/2022.  The patient had CBC completed on 11/15/2022 which showed white blood cell count 14.2.  She was referred for further evaluation.  CBC also showed hemoglobin 15.5, hematocrit 46.9, MCV 94.5.  Platelet count 268,000.  The patient denies prior history of leukocytosis.  She denies recent or recurrent infection.  She denies history of COVID-19.  She did receive COVID-19 booster on 10/13/2022.  She denies development of B type symptoms; no unintentional weight loss, poor appetite, early satiety, lymphadenopathy or abdominal bloating.  She denies history of autoimmune disorder or splenectomy.  She denies recent steroid use.  She is a current smoker of 1 to 2 cigarettes/day. Denies alcohol use.   Past medical history includes hypertension, osteoarthritis.   1/17/23- No changes. Recent sinus infection. WBC count 10.8.    4/17/23- No new complaints. No b-type symptoms. WBC count 7.7.   8/18/23- No b-type symptoms. Recently admitted with UTI. WBC count 10.0.   12/26/23-Feeling well, no new changes. WBC count 8.0.       Interval History 7/8/2024:   Patient is here for follow-up evaluation of leukocytosis.  She was last seen in clinic in December 2023. She states she has been feeling well. No ED visits or hospitalizations. She denies development of b-type symptoms; no unintentional weight loss, poor appetite, early satiety, persistent fever, recurrent infections. She denies recent colds or infections. Denies recent medication changes.        PMH, SH, and FH:  I reviewed the patients

## 2024-10-11 ENCOUNTER — APPOINTMENT (OUTPATIENT)
Dept: CT IMAGING | Age: 84
End: 2024-10-11
Payer: MEDICARE

## 2024-10-11 ENCOUNTER — HOSPITAL ENCOUNTER (EMERGENCY)
Age: 84
Discharge: HOME | End: 2024-10-11
Attending: FAMILY MEDICINE
Payer: MEDICARE

## 2024-10-11 VITALS
HEART RATE: 76 BPM | OXYGEN SATURATION: 99 % | RESPIRATION RATE: 18 BRPM | DIASTOLIC BLOOD PRESSURE: 76 MMHG | WEIGHT: 135 LBS | HEIGHT: 67 IN | SYSTOLIC BLOOD PRESSURE: 188 MMHG | BODY MASS INDEX: 21.19 KG/M2 | TEMPERATURE: 98 F

## 2024-10-11 DIAGNOSIS — S06.0X0A CONCUSSION WITHOUT LOSS OF CONSCIOUSNESS, INITIAL ENCOUNTER: ICD-10-CM
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
## 2024-10-11 DIAGNOSIS — S01.01XA LACERATION OF SCALP, INITIAL ENCOUNTER: ICD-10-CM

## 2024-10-11 PROCEDURE — 70450 CT HEAD/BRAIN W/O DYE: CPT

## 2024-10-11 PROCEDURE — 12001 RPR S/N/AX/GEN/TRNK 2.5CM/<: CPT

## 2024-10-11 PROCEDURE — 99284 EMERGENCY DEPT VISIT MOD MDM: CPT

## 2024-10-11 ASSESSMENT — PAIN - FUNCTIONAL ASSESSMENT: PAIN_FUNCTIONAL_ASSESSMENT: 0-10

## 2024-10-11 ASSESSMENT — PAIN SCALES - GENERAL: PAINLEVEL_OUTOF10: 3

## 2024-10-11 NOTE — ED PROVIDER NOTES
[facility] EMERGENCY DEPARTMENT  EMERGENCY DEPARTMENT ENCOUNTER          Pt Name: Jayde Vicente  MRN: [id number]  Birthdate 1940  Date of evaluation: 10/11/2024  Resident Physician: Robert Saldana MD EM Resident PGY-3  Attending Physician: Rohith Segura MD      CHIEF COMPLAINT       Chief Complaint   Patient presents with    Fall    Head Laceration     2 cm laceration to the back of her head    Tailbone Pain     Landed on tailbone         HISTORY OF PRESENT ILLNESS    HPI  Jayde Vicente is a 84 y.o. female who presents to the emergency department from home, as a walk in to the ED lobby for evaluation of fall.  Patient was at home when she had a mechanical fall backwards outside and struck her head without loss of consciousness.  Family states that patient was confused.  Patient is on no blood thinners.  States that she has had some sacral pain.  Her scalp laceration approximately 2 cm occipital was cleaned with water and hydroperoxide by family prior to arrival.  Patient alert oriented in no acute distress.      The patient has no other acute complaints at this time.    ROS negative except as stated above.    PAST MEDICAL AND SURGICAL HISTORY     Past Medical History:   Diagnosis Date    H/O bladder infections     Hyperlipidemia     Hypertension     Osteoarthritis      Past Surgical History:   Procedure Laterality Date    COLONOSCOPY      HYSTERECTOMY (CERVIX STATUS UNKNOWN)  9/03/2014    robotic assisted laprascopic hysterectomy    OTHER SURGICAL HISTORY  3/28/2014    posterior repair enterocele    OVARY SURGERY  2011    Mass on Ovary removed.    TRANSESOPHAGEAL ECHOCARDIOGRAM N/A 1/20/2023    TRANSESOPHAGEAL ECHOCARDIOGRAM performed by Kirlil Ovalles MD at [facility]         MEDICATIONS   No current facility-administered medications for this encounter.    Current Outpatient Medications:     ANORO ELLIPTA 62.5-25 MCG/ACT inhaler, 1 puff, Disp: , Rfl:     docusate (COLACE, DULCOLAX) 100 MG  wound visualized      Contaminated: no    Skin repair:     Repair method:  Staples    Number of staples:  2  Approximation:     Approximation:  Close  Repair type:     Repair type:  Simple  Post-procedure details:     Dressing:  Open (no dressing)    Procedure completion:  Tolerated well, no immediate complications  :       MEDICATION CHANGES     New Prescriptions    No medications on file         FINAL DISPOSITION   MDM  Patient 84-year-old female present emergency department today after mechanical fall backwards.  Patient had some confusion afterwards therefore evaluation may have had a mild concussion.  Patient found to have a 2 cm scalp laceration occipital area that was cleaned, repaired with 2 staples.  Patient CT head showed no intracranial hemorrhage or other significant abnormality.  Discussed follow-up with primary care for staple removal in 7 days and to keep the area clean and dry.  Patient and family at bedside agreeable with plan for discharge home and no further questions at this time.    Shared Decision-Making was performed, disposition discussed with the patient/family and questions answered.    Outpatient follow up (If applicable):  Staci Cee, APRN - CNP  [address]  [facility] [address]  [phone]    Schedule an appointment as soon as possible for a visit   For staple removal in 7 days    The results of pertinent diagnostic studies and exam findings were discussed with the patient/surrogate.   The patient’s provisional diagnosis and plan of care were discussed with the patient and present family who expressed understanding.   Medications were reviewed and indications and risks of medications were discussed with the patient/family present.   Strict return precautions and follow up instructions were discussed with the patient prior to discharge, with which the patient agrees.     FINAL DIAGNOSES:  Final diagnoses:   Fall, initial encounter   Laceration of scalp, initial encounter   Concussion

## 2024-10-11 NOTE — ED NOTES
Pt presents with c/o 2 cm laceration noted to the back of her head after tripping over the family dog and falling backwards on a stone driveway, pt denies being on blood thinners or having LOC, alert and oriented, able to ambulate to room with stand by assist, pt does c/o pain to her tailbone after the fall, states that the pain is worse when she sits down and bends over, daughters in with patient, laceration to back of head cleansed with wound cleanser, pt tolerated well.

## 2024-10-12 NOTE — DISCHARGE INSTRUCTIONS
You were seen evaluated emergency department today after having a fall.  We believe that you had a concussion due to the confusion that you had after the event.  Your CT scan showed no major damage to your brain.  You had a laceration to the scalp was repaired with staples and need to be removed in 7 days.  Follow-up with your primary care provider to do this.

## 2024-10-21 ENCOUNTER — HOSPITAL ENCOUNTER (OUTPATIENT)
Dept: GENERAL RADIOLOGY | Age: 84
Discharge: HOME | End: 2024-10-21
Payer: MEDICARE

## 2024-10-21 ENCOUNTER — HOSPITAL ENCOUNTER (OUTPATIENT)
Age: 84
Discharge: HOME | End: 2024-10-21
Payer: MEDICARE
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
## 2024-10-21 DIAGNOSIS — M53.3 COCCYX PAIN: ICD-10-CM

## 2024-10-21 PROCEDURE — 72220 X-RAY EXAM SACRUM TAILBONE: CPT

## 2025-01-14 ENCOUNTER — OFFICE VISIT (OUTPATIENT)
Dept: ONCOLOGY | Age: 85
End: 2025-01-14
Payer: MEDICARE

## 2025-01-14 ENCOUNTER — HOSPITAL ENCOUNTER (OUTPATIENT)
Dept: INFUSION THERAPY | Age: 85
Discharge: HOME | End: 2025-01-14
Payer: MEDICARE

## 2025-01-14 VITALS
TEMPERATURE: 97.7 F | RESPIRATION RATE: 16 BRPM | OXYGEN SATURATION: 94 % | HEIGHT: 67 IN | WEIGHT: 139 LBS | SYSTOLIC BLOOD PRESSURE: 139 MMHG | HEART RATE: 88 BPM | DIASTOLIC BLOOD PRESSURE: 60 MMHG | BODY MASS INDEX: 21.82 KG/M2

## 2025-01-14 VITALS
SYSTOLIC BLOOD PRESSURE: 129 MMHG | DIASTOLIC BLOOD PRESSURE: 60 MMHG | HEIGHT: 67 IN | BODY MASS INDEX: 21.82 KG/M2 | HEART RATE: 88 BPM | OXYGEN SATURATION: 94 % | WEIGHT: 139 LBS | RESPIRATION RATE: 16 BRPM | TEMPERATURE: 97.7 F

## 2025-01-14 DIAGNOSIS — D75.89 MACROCYTOSIS: Primary | ICD-10-CM

## 2025-01-14 DIAGNOSIS — Z86.2 HISTORY OF LEUKOCYTOSIS: ICD-10-CM

## 2025-01-14 DIAGNOSIS — D75.89 MACROCYTOSIS: ICD-10-CM

## 2025-01-14 LAB
ALBUMIN SERPL BCG-MCNC: 3.8 G/DL (ref 3.5–5.1)
ALP SERPL-CCNC: 57 U/L (ref 38–126)
ALT SERPL W/O P-5'-P-CCNC: 11 U/L (ref 11–66)
AST SERPL-CCNC: 19 U/L (ref 5–40)
BASOPHILS ABSOLUTE: 0 THOU/MM3 (ref 0–0.1)
BASOPHILS NFR BLD AUTO: 1 % (ref 0–3)
BILIRUB CONJ SERPL-MCNC: < 0.1 MG/DL (ref 0.1–13.8)
BILIRUB SERPL-MCNC: 0.3 MG/DL (ref 0.3–1.2)
EOSINOPHIL NFR BLD AUTO: 2 % (ref 0–4)
EOSINOPHILS ABSOLUTE: 0.1 THOU/MM3 (ref 0–0.4)
ERYTHROCYTE [DISTWIDTH] IN BLOOD BY AUTOMATED COUNT: 13.4 % (ref 11.5–14.5)
HCT VFR BLD AUTO: 41.2 % (ref 37–47)
HGB BLD-MCNC: 13.4 GM/DL (ref 12–16)
HGB RETIC QN AUTO: 34.3 PG (ref 28.2–35.7)
IMM RETICS NFR: 10.8 % (ref 3–15.9)
IMMATURE GRANULOCYTES %: 0 %
IMMATURE GRANULOCYTES ABSOLUTE: 0.01 THOU/MM3 (ref 0–0.07)
LYMPHOCYTES ABSOLUTE: 1.3 THOU/MM3 (ref 1–4.8)
LYMPHOCYTES NFR BLD AUTO: 24 % (ref 15–47)
MCH RBC QN AUTO: 32.7 PG (ref 26–33)
MCHC RBC AUTO-ENTMCNC: 32.5 GM/DL (ref 32.2–35.5)
MCV RBC AUTO: 101 FL (ref 81–99)
MONOCYTES ABSOLUTE: 0.5 THOU/MM3 (ref 0.4–1.3)
MONOCYTES NFR BLD AUTO: 11 % (ref 0–12)
NEUTROPHILS ABSOLUTE: 3.2 THOU/MM3 (ref 1.8–7.7)
NEUTROPHILS NFR BLD AUTO: 62 % (ref 43–75)
PLATELET # BLD AUTO: 300 THOU/MM3 (ref 130–400)
PMV BLD AUTO: 9.9 FL (ref 9.4–12.4)
PROT SERPL-MCNC: 6.4 G/DL (ref 6.1–8)
RBC # BLD AUTO: 4.1 MILL/MM3 (ref 4.2–5.4)
RETICS # AUTO: 72 THOU/MM3 (ref 20–115)
RETICS/RBC NFR AUTO: 1.7 % (ref 0.5–2)
TSH SERPL DL<=0.005 MIU/L-ACNC: 2.77 UIU/ML (ref 0.4–4.2)
VIT B12 SERPL-MCNC: 1123 PG/ML (ref 211–911)
WBC # BLD AUTO: 5.1 THOU/MM3 (ref 4.8–10.8)

## 2025-01-14 PROCEDURE — 4004F PT TOBACCO SCREEN RCVD TLK: CPT | Performed by: PHYSICIAN ASSISTANT

## 2025-01-14 PROCEDURE — 99211 OFF/OP EST MAY X REQ PHY/QHP: CPT

## 2025-01-14 PROCEDURE — 85046 RETICYTE/HGB CONCENTRATE: CPT

## 2025-01-14 PROCEDURE — 3078F DIAST BP <80 MM HG: CPT | Performed by: PHYSICIAN ASSISTANT

## 2025-01-14 PROCEDURE — 1123F ACP DISCUSS/DSCN MKR DOCD: CPT | Performed by: PHYSICIAN ASSISTANT

## 2025-01-14 PROCEDURE — 85025 COMPLETE CBC W/AUTO DIFF WBC: CPT

## 2025-01-14 PROCEDURE — 80076 HEPATIC FUNCTION PANEL: CPT

## 2025-01-14 PROCEDURE — 1159F MED LIST DOCD IN RCRD: CPT | Performed by: PHYSICIAN ASSISTANT

## 2025-01-14 PROCEDURE — 1090F PRES/ABSN URINE INCON ASSESS: CPT | Performed by: PHYSICIAN ASSISTANT

## 2025-01-14 PROCEDURE — 84443 ASSAY THYROID STIM HORMONE: CPT

## 2025-01-14 PROCEDURE — G8427 DOCREV CUR MEDS BY ELIG CLIN: HCPCS | Performed by: PHYSICIAN ASSISTANT

## 2025-01-14 PROCEDURE — 99213 OFFICE O/P EST LOW 20 MIN: CPT | Performed by: PHYSICIAN ASSISTANT

## 2025-01-14 PROCEDURE — 82607 VITAMIN B-12: CPT

## 2025-01-14 PROCEDURE — 3074F SYST BP LT 130 MM HG: CPT | Performed by: PHYSICIAN ASSISTANT

## 2025-01-14 PROCEDURE — 1160F RVW MEDS BY RX/DR IN RCRD: CPT | Performed by: PHYSICIAN ASSISTANT

## 2025-01-14 PROCEDURE — 1126F AMNT PAIN NOTED NONE PRSNT: CPT | Performed by: PHYSICIAN ASSISTANT

## 2025-01-14 PROCEDURE — 36415 COLL VENOUS BLD VENIPUNCTURE: CPT

## 2025-01-14 PROCEDURE — G8420 CALC BMI NORM PARAMETERS: HCPCS | Performed by: PHYSICIAN ASSISTANT

## 2025-01-14 PROCEDURE — G8399 PT W/DXA RESULTS DOCUMENT: HCPCS | Performed by: PHYSICIAN ASSISTANT

## 2025-01-14 NOTE — PROGRESS NOTES
[facility] [address], [address]  [facility] [address]  Dept: [phone]  Dept Fax: [phone] Loc: [phone]      Visit Date:1/14/2025     Jayde Vicente is a 84 y.o. female who presents today for:   Follow up       HPI:   Jayde Vicente is a 84 y.o. 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 She was seen as a new patient on 12/20/2022.  The patient had CBC completed on 11/15/2022 which showed white blood cell count 14.2.  She was referred for further evaluation.  CBC also showed hemoglobin 15.5, hematocrit 46.9, MCV 94.5.  Platelet count 268,000.  The patient denies prior history of leukocytosis.  She denies recent or recurrent infection.  She denies history of COVID-19.  She did receive COVID-19 booster on 10/13/2022.  She denies development of B type symptoms; no unintentional weight loss, poor appetite, early satiety, lymphadenopathy or abdominal bloating.  She denies history of autoimmune disorder or splenectomy.  She denies recent steroid use.  She is a current smoker of 1 to 2 cigarettes/day. Denies alcohol use.   Past medical history includes hypertension, osteoarthritis.   1/17/23- No changes. Recent sinus infection. WBC count 10.8.    4/17/23- No new complaints. No b-type symptoms. WBC count 7.7.   8/18/23- No b-type symptoms. Recently admitted with UTI. WBC count 10.0.   12/26/23-Feeling well, no new changes. WBC count 8.0.   7/8/24- No changes in overall symptoms. WBC count 9.7.      Interval History 1/14/2025:   Patient is here for follow-up evaluation of history of leukocytosis. She was last seen in clinic in July 2024. She had ED visit in October 2024 following fall at home. Fall was reported as mechanical and resulted in scalp laceration. She states she has been feeling well. Denies hospitalizations. she states she went to [place] on vacation with her grandson and returned a week ago.  She

## 2025-01-14 NOTE — PATIENT INSTRUCTIONS
Will add on labs today - discussed with lab can be added to already drawn blood  Will call with results  Return to clinic in 6 months  Labs on RTC  Please call for questions or concerns.

## 2025-02-18 NOTE — PATIENT INSTRUCTIONS
Your Provider for Today: Dr. Ovalles  Your nurses for today: Emma    You may receive a survey regarding the care you received during your visit.  Your input is valuable to us.  We encourage you to complete and return your survey.  We hope you will choose us in the future for your healthcare needs.

## 2025-02-19 ENCOUNTER — OFFICE VISIT (OUTPATIENT)
Dept: CARDIOLOGY CLINIC | Age: 85
End: 2025-02-19
Payer: MEDICARE

## 2025-02-19 VITALS
DIASTOLIC BLOOD PRESSURE: 80 MMHG | SYSTOLIC BLOOD PRESSURE: 132 MMHG | HEART RATE: 90 BPM | HEIGHT: 67 IN | WEIGHT: 136 LBS | BODY MASS INDEX: 21.35 KG/M2

## 2025-02-19 DIAGNOSIS — I35.0 AORTIC VALVE STENOSIS, ETIOLOGY OF CARDIAC VALVE DISEASE UNSPECIFIED: Primary | ICD-10-CM

## 2025-02-19 DIAGNOSIS — I35.0 NONRHEUMATIC AORTIC VALVE STENOSIS: ICD-10-CM

## 2025-02-19 PROCEDURE — G8428 CUR MEDS NOT DOCUMENT: HCPCS | Performed by: INTERNAL MEDICINE

## 2025-02-19 PROCEDURE — 99214 OFFICE O/P EST MOD 30 MIN: CPT | Performed by: INTERNAL MEDICINE

## 2025-02-19 PROCEDURE — 3079F DIAST BP 80-89 MM HG: CPT | Performed by: INTERNAL MEDICINE

## 2025-02-19 PROCEDURE — 4004F PT TOBACCO SCREEN RCVD TLK: CPT | Performed by: INTERNAL MEDICINE

## 2025-02-19 PROCEDURE — 1123F ACP DISCUSS/DSCN MKR DOCD: CPT | Performed by: INTERNAL MEDICINE

## 2025-02-19 PROCEDURE — 1090F PRES/ABSN URINE INCON ASSESS: CPT | Performed by: INTERNAL MEDICINE

## 2025-02-19 PROCEDURE — 3075F SYST BP GE 130 - 139MM HG: CPT | Performed by: INTERNAL MEDICINE

## 2025-02-19 PROCEDURE — G8420 CALC BMI NORM PARAMETERS: HCPCS | Performed by: INTERNAL MEDICINE

## 2025-02-19 PROCEDURE — G8399 PT W/DXA RESULTS DOCUMENT: HCPCS | Performed by: INTERNAL MEDICINE

## 2025-02-19 NOTE — PROGRESS NOTES
Pt here for 1 yr follow up    Pt states no c/o    Pt states med list is correct       
PO), Take 1,000 mg by mouth daily, Disp: , Rfl:     GARLIC PO, Take 1,000 mg by mouth daily, Disp: , Rfl:     simvastatin (ZOCOR) 20 MG tablet, Take 1 tablet by mouth nightly, Disp: , Rfl:     Past Medical History  Jayde  has a past medical history of H/O bladder infections, Hyperlipidemia, Hypertension, and Osteoarthritis.    Social History  Jayde  reports that she has been smoking cigarettes. She has a 14 pack-year smoking history. She has never used smokeless tobacco. She reports that she does not drink alcohol and does not use drugs.    Family History  Jayde family history includes Diabetes in her mother; Heart Disease (age of onset: 65) in her father; High Blood Pressure in her mother; Kidney Disease in her brother; Stroke (age of onset: 72) in her mother.    Past Surgical History   Past Surgical History:   Procedure Laterality Date    COLONOSCOPY      HYSTERECTOMY (CERVIX STATUS UNKNOWN)  9/03/2014    robotic assisted laprascopic hysterectomy    OTHER SURGICAL HISTORY  3/28/2014    posterior repair enterocele    OVARY SURGERY  2011    Mass on Ovary removed.    TRANSESOPHAGEAL ECHOCARDIOGRAM N/A 1/20/2023    TRANSESOPHAGEAL ECHOCARDIOGRAM performed by Kirill Ovalles MD at [facility]       Subjective:     REVIEW OF SYSTEMS  Constitutional: denies sweats, chills and fever  HENT: denies  congestion, sinus pressure, sneezing and sore throat.    Eyes: denies  pain, discharge, redness and itching.   Respiratory: denies apnea, cough  Gastrointestinal: denies blood in stool, constipation, diarrhea   Endocrine: denies cold intolerance, heat intolerance, polydipsia.  Genitourinary: denies dysuria, enuresis, flank pain and hematuria.   Musculoskeletal: denies arthralgias, joint swelling and neck pain.   Neurological: denies numbness and headaches.   Psychiatric/Behavioral: denies agitation, confusion, decreased concentration and dysphoric mood    All others reviewed and are negative.   Objective:     /80   Pulse

## 2025-03-03 ENCOUNTER — HOSPITAL ENCOUNTER (OUTPATIENT)
Age: 85
Discharge: HOME | End: 2025-03-05
Attending: INTERNAL MEDICINE
Payer: MEDICARE

## 2025-03-03 VITALS
HEIGHT: 67 IN | SYSTOLIC BLOOD PRESSURE: 132 MMHG | DIASTOLIC BLOOD PRESSURE: 80 MMHG | WEIGHT: 136 LBS | BODY MASS INDEX: 21.35 KG/M2

## 2025-03-03 DIAGNOSIS — I35.0 NONRHEUMATIC AORTIC VALVE STENOSIS: ICD-10-CM

## 2025-03-03 LAB
ECHO AR MAX VEL PISA: 3.1 M/S
ECHO AV CUSP MM: 0.8 CM
ECHO AV MEAN GRADIENT: 16 MMHG
ECHO AV MEAN VELOCITY: 1.9 M/S
ECHO AV PEAK GRADIENT: 29 MMHG
ECHO AV PEAK VELOCITY: 2.7 M/S
ECHO AV REGURGITANT PHT: 788 MS
ECHO AV VELOCITY RATIO: 0.37
ECHO AV VTI: 61.1 CM
ECHO BSA: 1.71 M2
ECHO LA AREA 4C: 18.6 CM2
ECHO LA DIAMETER INDEX: 2.5 CM/M2
ECHO LA DIAMETER: 4.3 CM
ECHO LA MAJOR AXIS: 5.1 CM
ECHO LA VOL MOD A4C: 55 ML (ref 22–52)
ECHO LA VOLUME INDEX MOD A4C: 32 ML/M2 (ref 16–34)
ECHO LV E' LATERAL VELOCITY: 5.4 CM/S
ECHO LV E' SEPTAL VELOCITY: 4.4 CM/S
ECHO LV EF PHYSICIAN: 55 %
ECHO LV FRACTIONAL SHORTENING: 29 % (ref 28–44)
ECHO LV INTERNAL DIMENSION DIASTOLE INDEX: 1.8 CM/M2
ECHO LV INTERNAL DIMENSION DIASTOLIC: 3.1 CM (ref 3.9–5.3)
ECHO LV INTERNAL DIMENSION SYSTOLIC INDEX: 1.28 CM/M2
ECHO LV INTERNAL DIMENSION SYSTOLIC: 2.2 CM
ECHO LV IVSD: 1.7 CM (ref 0.6–0.9)
ECHO LV MASS 2D: 138.1 G (ref 67–162)
ECHO LV MASS INDEX 2D: 80.3 G/M2 (ref 43–95)
ECHO LV POSTERIOR WALL DIASTOLIC: 1 CM (ref 0.6–0.9)
ECHO LV RELATIVE WALL THICKNESS RATIO: 0.65
ECHO LVOT AV VTI INDEX: 0.39
ECHO LVOT MEAN GRADIENT: 2 MMHG
ECHO LVOT PEAK GRADIENT: 4 MMHG
ECHO LVOT PEAK VELOCITY: 1 M/S
ECHO LVOT VTI: 23.6 CM
ECHO MV A VELOCITY: 1.26 M/S
ECHO MV E DECELERATION TIME (DT): 320 MS
ECHO MV E VELOCITY: 0.85 M/S
ECHO MV E/A RATIO: 0.67
ECHO MV E/E' LATERAL: 15.74
ECHO MV E/E' RATIO (AVERAGED): 17.53
ECHO MV E/E' SEPTAL: 19.32
ECHO PV MAX VELOCITY: 0.7 M/S
ECHO PV PEAK GRADIENT: 2 MMHG
ECHO RV INTERNAL DIMENSION: 2.5 CM
ECHO TV E WAVE: 0.5 M/S
ECHO TV REGURGITANT MAX VELOCITY: 2.46 M/S
ECHO TV REGURGITANT PEAK GRADIENT: 24 MMHG

## 2025-03-03 PROCEDURE — 93306 TTE W/DOPPLER COMPLETE: CPT

## 2025-03-03 PROCEDURE — 93306 TTE W/DOPPLER COMPLETE: CPT | Performed by: INTERNAL MEDICINE

## 2025-03-04 NOTE — DISCHARGE INSTRUCTIONS
Please start Zithromax and prednisone tomorrow morning  Please use albuterol 2 puffs every 3 hours as needed for shortness of breath  Please stop smoking  Please return if worse or new symptoms no distress

## 2025-07-16 DIAGNOSIS — D75.89 MACROCYTOSIS: Primary | ICD-10-CM

## 2025-07-16 DIAGNOSIS — Z86.2 HISTORY OF LEUKOCYTOSIS: ICD-10-CM

## 2025-07-17 ENCOUNTER — HOSPITAL ENCOUNTER (OUTPATIENT)
Dept: INFUSION THERAPY | Age: 85
Discharge: HOME | End: 2025-07-17
Payer: MEDICARE

## 2025-07-17 ENCOUNTER — OFFICE VISIT (OUTPATIENT)
Dept: ONCOLOGY | Age: 85
End: 2025-07-17
Payer: MEDICARE

## 2025-07-17 VITALS
SYSTOLIC BLOOD PRESSURE: 132 MMHG | TEMPERATURE: 98.1 F | RESPIRATION RATE: 18 BRPM | BODY MASS INDEX: 22 KG/M2 | WEIGHT: 140.2 LBS | OXYGEN SATURATION: 95 % | HEART RATE: 56 BPM | HEIGHT: 67 IN | DIASTOLIC BLOOD PRESSURE: 61 MMHG

## 2025-07-17 VITALS
OXYGEN SATURATION: 95 % | SYSTOLIC BLOOD PRESSURE: 132 MMHG | DIASTOLIC BLOOD PRESSURE: 61 MMHG | RESPIRATION RATE: 18 BRPM | HEART RATE: 56 BPM | TEMPERATURE: 98.1 F

## 2025-07-17 DIAGNOSIS — Z86.2 HISTORY OF LEUKOCYTOSIS: ICD-10-CM

## 2025-07-17 DIAGNOSIS — D75.89 MACROCYTOSIS: Primary | ICD-10-CM

## 2025-07-17 DIAGNOSIS — D75.89 MACROCYTOSIS: ICD-10-CM

## 2025-07-17 LAB
BASOPHILS ABSOLUTE: 0 THOU/MM3 (ref 0–0.1)
BASOPHILS NFR BLD AUTO: 1 % (ref 0–3)
EOSINOPHIL NFR BLD AUTO: 2 % (ref 0–4)
EOSINOPHILS ABSOLUTE: 0.1 THOU/MM3 (ref 0–0.4)
ERYTHROCYTE [DISTWIDTH] IN BLOOD BY AUTOMATED COUNT: 13 % (ref 11.5–14.5)
HCT VFR BLD AUTO: 43.7 % (ref 37–47)
HGB BLD-MCNC: 14.3 GM/DL (ref 12–16)
IMMATURE GRANULOCYTES %: 0 %
IMMATURE GRANULOCYTES ABSOLUTE: 0.01 THOU/MM3 (ref 0–0.07)
LYMPHOCYTES ABSOLUTE: 1.6 THOU/MM3 (ref 1–4.8)
LYMPHOCYTES NFR BLD AUTO: 22 % (ref 15–47)
MCH RBC QN AUTO: 32.6 PG (ref 26–33)
MCHC RBC AUTO-ENTMCNC: 32.7 GM/DL (ref 32.2–35.5)
MCV RBC AUTO: 100 FL (ref 81–99)
MONOCYTES ABSOLUTE: 0.8 THOU/MM3 (ref 0.4–1.3)
MONOCYTES NFR BLD AUTO: 11 % (ref 0–12)
NEUTROPHILS ABSOLUTE: 4.9 THOU/MM3 (ref 1.8–7.7)
NEUTROPHILS NFR BLD AUTO: 65 % (ref 43–75)
PLATELET # BLD AUTO: 281 THOU/MM3 (ref 130–400)
PMV BLD AUTO: 10 FL (ref 9.4–12.4)
RBC # BLD AUTO: 4.39 MILL/MM3 (ref 4.2–5.4)
WBC # BLD AUTO: 7.5 THOU/MM3 (ref 4.8–10.8)

## 2025-07-17 PROCEDURE — 99211 OFF/OP EST MAY X REQ PHY/QHP: CPT

## 2025-07-17 PROCEDURE — 1090F PRES/ABSN URINE INCON ASSESS: CPT | Performed by: PHYSICIAN ASSISTANT

## 2025-07-17 PROCEDURE — 99213 OFFICE O/P EST LOW 20 MIN: CPT | Performed by: PHYSICIAN ASSISTANT

## 2025-07-17 PROCEDURE — 1160F RVW MEDS BY RX/DR IN RCRD: CPT | Performed by: PHYSICIAN ASSISTANT

## 2025-07-17 PROCEDURE — 3078F DIAST BP <80 MM HG: CPT | Performed by: PHYSICIAN ASSISTANT

## 2025-07-17 PROCEDURE — G8427 DOCREV CUR MEDS BY ELIG CLIN: HCPCS | Performed by: PHYSICIAN ASSISTANT

## 2025-07-17 PROCEDURE — 1159F MED LIST DOCD IN RCRD: CPT | Performed by: PHYSICIAN ASSISTANT

## 2025-07-17 PROCEDURE — G8420 CALC BMI NORM PARAMETERS: HCPCS | Performed by: PHYSICIAN ASSISTANT

## 2025-07-17 PROCEDURE — 4004F PT TOBACCO SCREEN RCVD TLK: CPT | Performed by: PHYSICIAN ASSISTANT

## 2025-07-17 PROCEDURE — 85025 COMPLETE CBC W/AUTO DIFF WBC: CPT

## 2025-07-17 PROCEDURE — G8399 PT W/DXA RESULTS DOCUMENT: HCPCS | Performed by: PHYSICIAN ASSISTANT

## 2025-07-17 PROCEDURE — 3075F SYST BP GE 130 - 139MM HG: CPT | Performed by: PHYSICIAN ASSISTANT

## 2025-07-17 PROCEDURE — 36415 COLL VENOUS BLD VENIPUNCTURE: CPT

## 2025-07-17 PROCEDURE — 1123F ACP DISCUSS/DSCN MKR DOCD: CPT | Performed by: PHYSICIAN ASSISTANT

## 2025-07-17 PROCEDURE — 1126F AMNT PAIN NOTED NONE PRSNT: CPT | Performed by: PHYSICIAN ASSISTANT

## 2025-07-17 NOTE — PROGRESS NOTES
[facility] [address], [address]  [facility] [address]  Dept: [phone]  Dept Fax: [phone] Loc: [phone]      Visit Date:7/17/2025     Jayde Vicente is a 85 y.o. female who presents today for:   Follow up       HPI:   Jayde Vicente is a 85 y.o. 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 She was seen as a new patient on 12/20/2022.  The patient had CBC completed on 11/15/2022 which showed white blood cell count 14.2.  She was referred for further evaluation.  CBC also showed hemoglobin 15.5, hematocrit 46.9, MCV 94.5.  Platelet count 268,000.  The patient denies prior history of leukocytosis.  She denies recent or recurrent infection.  She denies history of COVID-19.  She did receive COVID-19 booster on 10/13/2022.  She denies development of B type symptoms; no unintentional weight loss, poor appetite, early satiety, lymphadenopathy or abdominal bloating.  She denies history of autoimmune disorder or splenectomy.  She denies recent steroid use.  She is a current smoker of 1 to 2 cigarettes/day. Denies alcohol use.   Past medical history includes hypertension, osteoarthritis.   1/17/23- No changes. Recent sinus infection. WBC count 10.8.    4/17/23- No new complaints. No b-type symptoms. WBC count 7.7.   8/18/23- No b-type symptoms. Recently admitted with UTI. WBC count 10.0.   12/26/23-Feeling well, no new changes. WBC count 8.0.   7/8/24- No changes in overall symptoms. WBC count 9.7.      Interval History 7/17/2025:   Patient is here for follow-up evaluation of history of leukocytosis. The patient was last seen in January 2025. She reports she has been feeling well overall. No ED visits or hospitalizations. She states she has been following with General Surgery due to recurrent cyst located near her buttocks. She states she had excision and developed recurrence. She denies recent antibiotic use. Denies

## (undated) DEVICE — GLOVE ORTHO 8   MSG9480